# Patient Record
Sex: MALE | Race: BLACK OR AFRICAN AMERICAN | Employment: FULL TIME | URBAN - METROPOLITAN AREA
[De-identification: names, ages, dates, MRNs, and addresses within clinical notes are randomized per-mention and may not be internally consistent; named-entity substitution may affect disease eponyms.]

---

## 2017-04-02 ENCOUNTER — APPOINTMENT (OUTPATIENT)
Dept: GENERAL RADIOLOGY | Age: 27
End: 2017-04-02
Attending: EMERGENCY MEDICINE
Payer: COMMERCIAL

## 2017-04-02 ENCOUNTER — HOSPITAL ENCOUNTER (EMERGENCY)
Age: 27
Discharge: HOME | End: 2017-04-02
Attending: EMERGENCY MEDICINE
Payer: COMMERCIAL

## 2017-04-02 VITALS
RESPIRATION RATE: 15 BRPM | DIASTOLIC BLOOD PRESSURE: 87 MMHG | BODY MASS INDEX: 20.62 KG/M2 | HEART RATE: 117 BPM | SYSTOLIC BLOOD PRESSURE: 135 MMHG | TEMPERATURE: 100 F | WEIGHT: 165 LBS | OXYGEN SATURATION: 100 %

## 2017-04-02 DIAGNOSIS — J01.10 ACUTE NON-RECURRENT FRONTAL SINUSITIS: Primary | ICD-10-CM

## 2017-04-02 DIAGNOSIS — R07.89 CHEST TIGHTNESS: ICD-10-CM

## 2017-04-02 LAB
FLUAV AG NPH QL IA: NEGATIVE
FLUBV AG NOSE QL IA: NEGATIVE

## 2017-04-02 PROCEDURE — 71020 XR CHEST PA LAT: CPT

## 2017-04-02 PROCEDURE — 87804 INFLUENZA ASSAY W/OPTIC: CPT | Performed by: EMERGENCY MEDICINE

## 2017-04-02 PROCEDURE — 99282 EMERGENCY DEPT VISIT SF MDM: CPT

## 2017-04-02 RX ORDER — TRIAMCINOLONE ACETONIDE 55 UG/1
SPRAY, METERED NASAL
Qty: 16.5 G | Refills: 0 | Status: ON HOLD | OUTPATIENT
Start: 2017-04-02 | End: 2021-02-07

## 2017-04-02 RX ORDER — LORATADINE 10 MG/1
TABLET ORAL
Qty: 30 TAB | Refills: 0 | Status: SHIPPED | OUTPATIENT
Start: 2017-04-02 | End: 2018-02-21

## 2017-04-03 NOTE — ED NOTES
I have reviewed discharge instructions with the patient. The patient verbalized understanding. Patient armband removed and shredded. Patient discharged ambulatory to [facility].
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
## 2017-08-26 ENCOUNTER — HOSPITAL ENCOUNTER (EMERGENCY)
Age: 27
Discharge: HOME | End: 2017-08-26
Attending: EMERGENCY MEDICINE
Payer: COMMERCIAL

## 2017-08-26 VITALS
HEIGHT: 74 IN | HEART RATE: 99 BPM | WEIGHT: 163 LBS | DIASTOLIC BLOOD PRESSURE: 112 MMHG | OXYGEN SATURATION: 100 % | RESPIRATION RATE: 18 BRPM | SYSTOLIC BLOOD PRESSURE: 162 MMHG | TEMPERATURE: 98.2 F | BODY MASS INDEX: 20.92 KG/M2

## 2017-08-26 DIAGNOSIS — K08.89 TOOTHACHE: Primary | ICD-10-CM

## 2017-08-26 PROCEDURE — 99282 EMERGENCY DEPT VISIT SF MDM: CPT

## 2017-08-26 RX ORDER — HYDROCODONE BITARTRATE AND ACETAMINOPHEN 5; 325 MG/1; MG/1
TABLET ORAL
Qty: 6 TAB | Refills: 0 | Status: SHIPPED | OUTPATIENT
Start: 2017-08-26 | End: 2018-01-22

## 2017-08-26 RX ORDER — PENICILLIN V POTASSIUM 500 MG/1
500 TABLET, FILM COATED ORAL 4 TIMES DAILY
Qty: 40 TAB | Refills: 0 | Status: SHIPPED | OUTPATIENT
Start: 2017-08-26 | End: 2017-09-05
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
## 2017-08-26 NOTE — LETTER
NOTIFICATION RETURN TO WORK 
 
8/26/2017 7:03 PM 
 
Mr. Anu Krishnan [address] [place] [address]. [facility] [phone] To Whom It May Concern: 
 
Anu Krishnan is currently under the care of [facility] EMERGENCY DEPT. He will return to work on: 8/27/17 If there are questions or concerns please have the patient contact our office.  
 
 
 
Sincerely, 
 
 
 
 
 
 
 
 
Aly Ashraf MD

## 2018-01-22 ENCOUNTER — HOSPITAL ENCOUNTER (EMERGENCY)
Age: 28
Discharge: HOME | End: 2018-01-22
Attending: EMERGENCY MEDICINE | Admitting: EMERGENCY MEDICINE
Payer: SELF-PAY

## 2018-01-22 VITALS
WEIGHT: 168 LBS | DIASTOLIC BLOOD PRESSURE: 86 MMHG | TEMPERATURE: 98.7 F | HEIGHT: 75 IN | HEART RATE: 67 BPM | BODY MASS INDEX: 20.89 KG/M2 | RESPIRATION RATE: 16 BRPM | OXYGEN SATURATION: 100 % | SYSTOLIC BLOOD PRESSURE: 123 MMHG

## 2018-01-22 DIAGNOSIS — R03.0 ELEVATED BLOOD PRESSURE READING: ICD-10-CM

## 2018-01-22 DIAGNOSIS — F32.A DEPRESSION, UNSPECIFIED DEPRESSION TYPE: Primary | ICD-10-CM

## 2018-01-22 PROBLEM — R45.851 SUICIDAL IDEATION: Status: ACTIVE | Noted: 2018-01-22

## 2018-01-22 LAB
AMPHET UR QL SCN: NEGATIVE
ANION GAP SERPL CALC-SCNC: 6 MMOL/L (ref 3–18)
BARBITURATES UR QL SCN: NEGATIVE
BASOPHILS # BLD: 0 K/UL (ref 0–0.06)
BASOPHILS NFR BLD: 0 % (ref 0–2)
BENZODIAZ UR QL: NEGATIVE
BUN SERPL-MCNC: 13 MG/DL (ref 7–18)
BUN/CREAT SERPL: 14 (ref 12–20)
CALCIUM SERPL-MCNC: 9 MG/DL (ref 8.5–10.1)
CANNABINOIDS UR QL SCN: NEGATIVE
CHLORIDE SERPL-SCNC: 110 MMOL/L (ref 100–108)
CO2 SERPL-SCNC: 24 MMOL/L (ref 21–32)
COCAINE UR QL SCN: NEGATIVE
CREAT SERPL-MCNC: 0.92 MG/DL (ref 0.6–1.3)
DIFFERENTIAL METHOD BLD: NORMAL
EOSINOPHIL # BLD: 0.1 K/UL (ref 0–0.4)
EOSINOPHIL NFR BLD: 2 % (ref 0–5)
ERYTHROCYTE [DISTWIDTH] IN BLOOD BY AUTOMATED COUNT: 13.2 % (ref 11.6–14.5)
ETHANOL SERPL-MCNC: <3 MG/DL (ref 0–3)
GLUCOSE SERPL-MCNC: 92 MG/DL (ref 74–99)
HCT VFR BLD AUTO: 42.8 % (ref 36–48)
HDSCOM,HDSCOM: NORMAL
HGB BLD-MCNC: 15.2 G/DL (ref 13–16)
LYMPHOCYTES # BLD: 2.2 K/UL (ref 0.9–3.6)
LYMPHOCYTES NFR BLD: 32 % (ref 21–52)
MCH RBC QN AUTO: 31.1 PG (ref 24–34)
MCHC RBC AUTO-ENTMCNC: 35.5 G/DL (ref 31–37)
MCV RBC AUTO: 87.5 FL (ref 74–97)
METHADONE UR QL: NEGATIVE
MONOCYTES # BLD: 0.5 K/UL (ref 0.05–1.2)
MONOCYTES NFR BLD: 7 % (ref 3–10)
NEUTS SEG # BLD: 4.1 K/UL (ref 1.8–8)
NEUTS SEG NFR BLD: 59 % (ref 40–73)
OPIATES UR QL: NEGATIVE
PCP UR QL: NEGATIVE
PLATELET # BLD AUTO: 322 K/UL (ref 135–420)
PMV BLD AUTO: 9.4 FL (ref 9.2–11.8)
POTASSIUM SERPL-SCNC: 3.8 MMOL/L (ref 3.5–5.5)
RBC # BLD AUTO: 4.89 M/UL (ref 4.7–5.5)
SODIUM SERPL-SCNC: 140 MMOL/L (ref 136–145)
WBC # BLD AUTO: 6.9 K/UL (ref 4.6–13.2)

## 2018-01-22 PROCEDURE — 80048 BASIC METABOLIC PNL TOTAL CA: CPT | Performed by: PHYSICIAN ASSISTANT

## 2018-01-22 PROCEDURE — 80307 DRUG TEST PRSMV CHEM ANLYZR: CPT | Performed by: PHYSICIAN ASSISTANT

## 2018-01-22 PROCEDURE — 74011250637 HC RX REV CODE- 250/637: Performed by: PHYSICIAN ASSISTANT

## 2018-01-22 PROCEDURE — 99285 EMERGENCY DEPT VISIT HI MDM: CPT

## 2018-01-22 PROCEDURE — 85025 COMPLETE CBC W/AUTO DIFF WBC: CPT | Performed by: PHYSICIAN ASSISTANT

## 2018-01-22 RX ORDER — RISPERIDONE 0.5 MG/1
0.5 TABLET, ORALLY DISINTEGRATING ORAL 2 TIMES DAILY
Qty: 10 TAB | Refills: 0 | Status: ON HOLD | OUTPATIENT
Start: 2018-01-22 | End: 2021-02-07

## 2018-01-22 RX ORDER — ACETAMINOPHEN 325 MG/1
650 TABLET ORAL
Status: COMPLETED | OUTPATIENT
Start: 2018-01-22 | End: 2018-01-22

## 2018-01-22 RX ADMIN — ACETAMINOPHEN 650 MG: 325 TABLET, FILM COATED ORAL at 13:05

## 2018-01-22 NOTE — Clinical Note
Follow-up with the resources provided within 2 days for reassessment. Bring the results from this visit with your for their review. Return to the ED for any new, worsening, or persistent symptoms, including thoughts of hurting yourself or others, increas ed depression, or any other medical concerns.
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
## 2018-01-22 NOTE — LETTER
[facility] EMERGENCY DEPT 
[phone] 
[phone] Work/School Note Date: 1/22/2018 To Whom It May concern: 
 
Charlean Cooks was seen and treated today in the emergency room by the following provider(s): 
Attending Provider: Leigh Ann Johnson DO Physician Assistant: Iesha Yin. Charlean Cooks may return to work on 1/23/18. Sincerely, Yvonne Guerrero

## 2018-01-22 NOTE — ED NOTES
Tamie Diallo called for assistance with placement. Spoke with Joann Rivera and information given.   Per Joann Felt they might not be able to look at patient tonight however patient will be on list.
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
## 2018-01-23 NOTE — ED NOTES
6:00 PM  Assumed care from [facility]. Pending 2nd consult for Tele-psych. PT was evaluated by [facility] and does not warrant a TDO. PT signed contract for safety and was given outpatient resources. Waiting for tele-psych for recommendations for outpatient care medication before discharge. 7:30 PM Pt is becoming frustrated in room. Pt states that he signed the contract and wants to leave now and does not understand why staff will not let him go. I explained pending consult with psychiatrist but pt is frustrated with length of time it is taking for return call. Discussed pt with Dr Heber Bob and will discharge home on Risperidone .5mg. Consult:    7:36 PM   Discussed care with Dr Elias Ceja, tele psych. Standard discussion; including history of patients chief complaint, available diagnostic results, and treatment course. PLAN: Discharge home with short course of Risperidone .5 BID  No more than 10 pills. Then outpatient follow up.

## 2018-01-23 NOTE — ED NOTES
I have reviewed discharge instructions with the patient. The patient verbalized understanding. Patient armband removed and shredded. VSS. Patient verbalized understanding of how to properly take prescribed medication. Patient given contract for safety to take home and scanned in to chart.  Patient given 1 bag of belongings back and envelope from security

## 2018-01-23 NOTE — DISCHARGE INSTRUCTIONS
Elevated Blood Pressure: Care Instructions  Your Care Instructions    Blood pressure is a measure of how hard the blood pushes against the walls of your arteries. It's normal for blood pressure to go up and down throughout the day. But if it stays up over time, you have high blood pressure. Two numbers tell you your blood pressure. The first number is the systolic pressure. It shows how hard the blood pushes when your heart is pumping. The second number is the diastolic pressure. It shows how hard the blood pushes between heartbeats, when your heart is relaxed and filling with blood. An ideal blood pressure in adults is less than 120/80 (say \"120 over 80\"). High blood pressure is 140/90 or higher. You have high blood pressure if your top number is 140 or higher or your bottom number is 90 or higher, or both. The main test for high blood pressure is simple, fast, and painless. To diagnose high blood pressure, your doctor will test your blood pressure at different times. After testing your blood pressure, your doctor may ask you to test it again when you are home. If you are diagnosed with high blood pressure, you can work with your doctor to make a long-term plan to manage it. Follow-up care is a key part of your treatment and safety. Be sure to make and go to all appointments, and call your doctor if you are having problems. It's also a good idea to know your test results and keep a list of the medicines you take. How can you care for yourself at home? · Do not smoke. Smoking increases your risk for heart attack and stroke. If you need help quitting, talk to your doctor about stop-smoking programs and medicines. These can increase your chances of quitting for good. · Stay at a healthy weight. · Try to limit how much sodium you eat to less than 2,300 milligrams (mg) a day. Your doctor may ask you to try to eat less than 1,500 mg a day. · Be physically active.  Get at least 30 minutes of exercise on most days of the week. Walking is a good choice. You also may want to do other activities, such as running, swimming, cycling, or playing tennis or team sports. · Avoid or limit alcohol. Talk to your doctor about whether you can drink any alcohol. · Eat plenty of fruits, vegetables, and low-fat dairy products. Eat less saturated and total fats. · Learn how to check your blood pressure at home. When should you call for help? Call your doctor now or seek immediate medical care if:  ? · Your blood pressure is much higher than normal (such as 180/110 or higher). ? · You think high blood pressure is causing symptoms such as:  ¨ Severe headache. ¨ Blurry vision. ? Watch closely for changes in your health, and be sure to contact your doctor if:  ? · You do not get better as expected. Where can you learn more? Go to http://marActive-Semidavid.info/. Enter W587 in the search box to learn more about \"Elevated Blood Pressure: Care Instructions. \"  Current as of: September 21, 2016  Content Version: 11.4  © 9699-5209 [employer]. Care instructions adapted under license by [employer] (which disclaims liability or warranty for this information). If you have questions about a medical condition or this instruction, always ask your healthcare professional. [address] any warranty or liability for your use of this information. Preventing a Relapse of Depression: Care Instructions  Your Care Instructions    A relapse of depression means your symptoms have come back after you have gotten better. This illness often comes and goes during a lifetime. But there are many things you can do to keep it from coming back. Follow-up care is a key part of your treatment and safety. Be sure to make and go to all appointments, and call your doctor if you are having problems. It's also a good idea to know your test results and keep a list of the medicines you take.   What do you need to know? Know your risk of relapse  Talk to your doctor to find out if you are at risk of relapse. Many things can make a person more likely to relapse into depression. These include having a family member with depression, dealing with serious problems in a relationship or a job, having a serious medical condition, or abusing drugs or alcohol. It is important to know your risk and to recognize warning signs of relapse. Once you know these things, you will be better able to keep it from happening to you. Know the warning signs of relapse  The two most common signs of relapse are:  · Feeling sad or hopeless. · Losing interest in your daily activities. You may have other symptoms, such as:  · You lose or gain weight. · You sleep too much or not enough. · You feel restless and unable to sit still. · You feel unable to move. · You feel tired all the time. · You feel unworthy or guilty without an obvious reason. · You have problems concentrating, remembering, or making decisions. · You think often about death or suicide. · You feel angry or have panic attacks. How can you care for yourself at home? · Take your medicine as prescribed. Call your doctor if you have any problems with your medicine. Many people take their medicines for at least 6 months after they have recovered. This often helps keep symptoms from coming back. However, if your depression keeps coming back, you may have to take medicine for the rest of your life. · Continue counseling even after you have stopped taking medicine. · Eat healthy foods. Include fruits, vegetables, beans, and whole grains in your diet each day. · Get at least 30 minutes of exercise on most days of the week. Walking is a good choice. You also may want to do other activities, such as running, swimming, cycling, or playing tennis or team sports. · See your doctor right away if you have new symptoms or feel that your depression is coming back.   · Keep a regular sleep schedule. Try for 8 hours of sleep a night. · Avoid alcohol and illegal drugs. · Keep the numbers for these national suicide hotlines: 8-298-560-TALK (3-942.439.5360) and 8-288-GPZKVBR (4-675.209.6776). If you or someone you know talks about suicide or feeling hopeless, get help right away. When should you call for help? Call 911 anytime you think you may need emergency care. For example, call if:  ? · You are thinking about suicide or are threatening suicide. ? · You feel you cannot stop from hurting yourself or someone else. ? · You hear or see things that aren't real.   ? · You think or speak in a bizarre way that is not like your usual behavior. ?Call your doctor now or seek immediate medical care if:  ? · You are drinking a lot of alcohol or using illegal drugs. ? · You are talking or writing about death. ? Watch closely for changes in your health, and be sure to contact your doctor if:  ? · You find it hard or it's getting harder to deal with school, a job, family, or friends. ? · You think your treatment is not helping or you are not getting better. ? · Your symptoms get worse or you get new symptoms. ? · You have any problems with your antidepressant medicines, such as side effects, or you are thinking about stopping your medicine. ? · You are having manic behavior, such as having very high energy, needing less sleep than normal, or showing risky behavior such as spending money you don't have or abusing others verbally or physically. Where can you learn more? Go to http://mar-david.info/. Enter S791 in the search box to learn more about \"Preventing a Relapse of Depression: Care Instructions. \"  Current as of: May 12, 2017  Content Version: 11.4  © 3584-5391 [employer]. Care instructions adapted under license by Remote (which disclaims liability or warranty for this information).  If you have questions about a medical condition or this instruction, always ask your healthcare professional. [address] any warranty or liability for your use of this information. Depression Treatment: Care Instructions  Your Care Instructions    Depression is a condition that affects the way you feel, think, and act. It causes symptoms such as low energy, loss of interest in daily activities, and sadness or grouchiness that goes on for a long time. Depression is very common and affects men and women of all ages. Depression is a medical illness caused by changes in the natural chemicals in your brain. It is not a character flaw, and it does not mean that you are a bad or weak person. It does not mean that you are going crazy. It is important to know that depression can be treated. Medicines, counseling, and self-care can all help. Many people do not get help because they are embarrassed or think that they will get over the depression on their own. But some people do not get better without treatment. Follow-up care is a key part of your treatment and safety. Be sure to make and go to all appointments, and call your doctor if you are having problems. It's also a good idea to know your test results and keep a list of the medicines you take. How can you care for yourself at home? Learn about antidepressant medicines  Antidepressant medicines can improve or end the symptoms of depression. You may need to take the medicine for at least 6 months, and often longer. Keep taking your medicine even if you feel better. If you stop taking it too soon, your symptoms may come back or get worse. You may start to feel better within 1 to 3 weeks of taking antidepressant medicine. But it can take as many as 6 to 8 weeks to see more improvement. Talk to your doctor if you have problems with your medicine or if you do not notice any improvement after 3 weeks. Antidepressants can make you feel tired, dizzy, or nervous.  Some people have dry mouth, constipation, headaches, sexual problems, an upset stomach, or diarrhea. Many of these side effects are mild and go away on their own after you take the medicine for a few weeks. Some may last longer. Talk to your doctor if side effects bother you too much. You might be able to try a different medicine. If you are pregnant or breastfeeding, talk to your doctor about what medicines you can take. Learn about counseling  In many cases, counseling can work as well as medicines to treat mild to moderate depression. Counseling is done by licensed mental health providers, such as psychologists, social workers, and some types of nurses. It can be done in one-on-one sessions or in a group setting. Many people find group sessions helpful. Cognitive-behavioral therapy is a type of counseling. In this treatment therapy, you learn how to see and change unhelpful thinking styles that may be adding to your depression. Counseling and medicines often work well when used together. To manage depression  · Be physically active. Getting 30 minutes of exercise each day is good for your body and your mind. Begin slowly if it is hard for you to get started. If you already exercise, keep it up. · Plan something pleasant for yourself every day. Include activities that you have enjoyed in the past.  · Get enough sleep. Talk to your doctor if you have problems sleeping. · Eat a balanced diet. If you do not feel hungry, eat small snacks rather than large meals. · Do not drink alcohol, use illegal drugs, or take medicines that your doctor has not prescribed for you. They may interfere with your treatment. · Spend time with family and friends. It may help to speak openly about your depression with people you trust.  · Take your medicines exactly as prescribed. Call your doctor if you think you are having a problem with your medicine. · Do not make major life decisions while you are depressed.  Depression may change the way you think. Elizabeth Yost will be able to make better decisions after you feel better. · Think positively. Challenge negative thoughts with statements such as \"I am hopeful\"; \"Things will get better\"; and \"I can ask for the help I need. \" Write down these statements and read them often, even if you don't believe them yet. · Be patient with yourself. It took time for your depression to develop, and it will take time for your symptoms to improve. Do not take on too much or be too hard on yourself. · Learn all you can about depression from written and online materials. · Check out behavioral health classes to learn more about dealing with depression. · Keep the numbers for these national suicide hotlines: 5-403-388-TALK (5-694.410.1766) and 3-241-WXQFXCH (2-907.487.4765). If you or someone you know talks about suicide or feeling hopeless, get help right away. When should you call for help? Call 911 anytime you think you may need emergency care. For example, call if:  ? · You feel you cannot stop from hurting yourself or someone else. ?Call your doctor now or seek immediate medical care if:  ? · You hear voices. ? · You feel much more depressed. ? Watch closely for changes in your health, and be sure to contact your doctor if:  ? · You are having problems with your depression medicine. ? · You are not getting better as expected. Where can you learn more? Go to http://mra-david.info/. Enter B937 in the search box to learn more about \"Depression Treatment: Care Instructions. \"  Current as of: May 12, 2017  Content Version: 11.4  © 4453-2611 [employer]. Care instructions adapted under license by [employer] (which disclaims liability or warranty for this information).  If you have questions about a medical condition or this instruction, always ask your healthcare professional. [address] any warranty or liability for your use of this information. MyChart Activation    Thank you for requesting access to Telebit. Please follow the instructions below to securely access and download your online medical record. Telebit allows you to send messages to your doctor, view your test results, renew your prescriptions, schedule appointments, and more. How Do I Sign Up? 1. In your internet browser, go to [email or web address]  2. Click on the First Time User? Click Here link in the Sign In box. You will be redirect to the New Member Sign Up page. 3. Enter your Telebit Access Code exactly as it appears below. You will not need to use this code after youve completed the sign-up process. If you do not sign up before the expiration date, you must request a new code. Telebit Access Code: [email or web address]  Expires: 2018  5:52 PM (This is the date your Telebit access code will )    4. Enter the last four digits of your Social Security Number (xxxx) and Date of Birth (mm/dd/yyyy) as indicated and click Submit. You will be taken to the next sign-up page. 5. Create a Telebit ID. This will be your Telebit login ID and cannot be changed, so think of one that is secure and easy to remember. 6. Create a Telebit password. You can change your password at any time. 7. Enter your Password Reset Question and Answer. This can be used at a later time if you forget your password. 8. Enter your e-mail address. You will receive e-mail notification when new information is available in [address]. 9. Click Sign Up. You can now view and download portions of your medical record. 10. Click the Download Summary menu link to download a portable copy of your medical information. Additional Information    If you have questions, please visit the Frequently Asked Questions section of the Telebit website at [email or web address] Remember, Telebit is NOT to be used for urgent needs. For medical emergencies, dial 911.

## 2018-01-23 NOTE — ED NOTES
Patient very angry at bedside stating \"im about to walk out of here, there is no reason why ic ant leave, im about to walk out of here, I dont want to be that susan but im about to walk out\". Valente Paez., PA at bedside explaining to patient he is waiting to hear from psychiatrist. Psych assessment delayed because patient very angry at this time.

## 2018-01-23 NOTE — ED NOTES
Verbal shift change report given to Gracie Flores RN (oncoming nurse) by Bay Ling RN (offgoing nurse). Report included the following information SBAR, ED Summary and MAR.

## 2018-02-21 ENCOUNTER — HOSPITAL ENCOUNTER (EMERGENCY)
Age: 28
Discharge: HOME | End: 2018-02-21
Attending: EMERGENCY MEDICINE
Payer: SELF-PAY

## 2018-02-21 VITALS
OXYGEN SATURATION: 100 % | BODY MASS INDEX: 21.37 KG/M2 | SYSTOLIC BLOOD PRESSURE: 122 MMHG | HEART RATE: 83 BPM | RESPIRATION RATE: 16 BRPM | TEMPERATURE: 97.8 F | DIASTOLIC BLOOD PRESSURE: 85 MMHG | WEIGHT: 171 LBS

## 2018-02-21 DIAGNOSIS — F07.81 POST CONCUSSIVE SYNDROME: Primary | ICD-10-CM

## 2018-02-21 PROCEDURE — 99283 EMERGENCY DEPT VISIT LOW MDM: CPT

## 2018-02-21 RX ORDER — NAPROXEN 500 MG/1
500 TABLET ORAL 2 TIMES DAILY WITH MEALS
Qty: 20 TAB | Refills: 0 | OUTPATIENT
Start: 2018-02-21 | End: 2019-11-01

## 2018-02-21 RX ORDER — ACETAMINOPHEN 500 MG
1000 TABLET ORAL
Qty: 40 TAB | Refills: 0 | Status: SHIPPED | OUTPATIENT
Start: 2018-02-21

## 2018-02-21 NOTE — LETTER
NOTIFICATION RETURN TO WORK / SCHOOL 
 
2/21/2018 8:58 PM 
 
Mr. Alis Ward [address] [place] [address]. [place] [id number] To Whom It May Concern: 
 
Alis Ward is currently under the care of [facility] EMERGENCY DEPT. He will return to work/school on: 2/26/18 If there are questions or concerns please have the patient contact our office. Sincerely, Evelyn Pitt MD
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
## 2019-11-01 ENCOUNTER — HOSPITAL ENCOUNTER (EMERGENCY)
Age: 29
Discharge: HOME | End: 2019-11-01
Attending: EMERGENCY MEDICINE
Payer: SELF-PAY

## 2019-11-01 VITALS
BODY MASS INDEX: 23.13 KG/M2 | DIASTOLIC BLOOD PRESSURE: 100 MMHG | OXYGEN SATURATION: 100 % | HEIGHT: 75 IN | HEART RATE: 86 BPM | WEIGHT: 186 LBS | TEMPERATURE: 98.5 F | RESPIRATION RATE: 18 BRPM | SYSTOLIC BLOOD PRESSURE: 158 MMHG

## 2019-11-01 DIAGNOSIS — K08.89 TOOTHACHE: Primary | ICD-10-CM

## 2019-11-01 PROCEDURE — 74011250637 HC RX REV CODE- 250/637: Performed by: EMERGENCY MEDICINE

## 2019-11-01 PROCEDURE — 99283 EMERGENCY DEPT VISIT LOW MDM: CPT

## 2019-11-01 RX ORDER — NAPROXEN 500 MG/1
500 TABLET ORAL 2 TIMES DAILY WITH MEALS
Qty: 20 TAB | Refills: 0 | Status: SHIPPED | OUTPATIENT
Start: 2019-11-01 | End: 2019-11-11

## 2019-11-01 RX ORDER — PENICILLIN V POTASSIUM 500 MG/1
500 TABLET, FILM COATED ORAL 4 TIMES DAILY
Qty: 40 TAB | Refills: 0 | Status: SHIPPED | OUTPATIENT
Start: 2019-11-01 | End: 2019-11-11

## 2019-11-01 RX ORDER — HYDROCODONE BITARTRATE AND ACETAMINOPHEN 7.5; 325 MG/1; MG/1
1 TABLET ORAL
Status: COMPLETED | OUTPATIENT
Start: 2019-11-01 | End: 2019-11-01

## 2019-11-01 RX ORDER — HYDROCODONE BITARTRATE AND ACETAMINOPHEN 7.5; 325 MG/1; MG/1
TABLET ORAL
Status: DISCONTINUED
Start: 2019-11-01 | End: 2019-11-01 | Stop reason: HOSPADM

## 2019-11-01 RX ADMIN — HYDROCODONE BITARTRATE AND ACETAMINOPHEN 1 TABLET: 7.5; 325 TABLET ORAL at 01:19
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
## 2019-11-01 NOTE — DISCHARGE INSTRUCTIONS
SPECIFIC PATIENT INSTRUCTIONS FROM THE PHYSICIAN WHO TREATED YOU IN THE ER TODAY  1. Return if any concerns or worsening of condition(s)  2. Penicillin as prescribed until finished. Naprosyn for pain not controlled with over the counter Tylenol. 3.  Follow up with your dentist or a dental clinic from the sheets given to you in the ER today as soon as possible. Patient Education        Tooth and Gum Pain in Teens: Care Instructions  Your Care Instructions    The most common causes of dental pain are tooth decay and gum disease. Pain can also be caused by an infection of the tooth (abscess) or the gums. Or you may have pain from a broken or cracked tooth. Other causes of pain include infection and damage to a tooth from nervous grinding of your teeth. A wisdom tooth can be painful when it is coming in but cannot break through the gum. It can also be painful when the tooth is only partway in and extra gum tissue has formed around it. The tissue can get inflamed (pericoronitis), and sometimes it gets infected. Prompt dental care can help find the cause of your toothache and keep the tooth from dying or gum disease from getting worse. Self-care at home may reduce your pain and discomfort. Follow-up care is a key part of your treatment and safety. Be sure to make and go to all appointments, and call your dentist or doctor if you are having problems. It's also a good idea to know your test results and keep a list of the medicines you take. How can you care for yourself at home? · To reduce pain and facial swelling, put an ice or cold pack on the outside of your cheek for 10 to 20 minutes at a time. Put a thin cloth between the ice and your skin. Do not use heat. · If your doctor prescribed antibiotics, take them as directed. Do not stop taking them just because you feel better. You need to take the full course of antibiotics.   · Ask your doctor if you can take an over-the-counter pain medicine, such as acetaminophen (Tylenol), ibuprofen (Advil, Motrin), or naproxen (Aleve). Be safe with medicines. Read and follow all instructions on the label. · Avoid very hot, cold, or sweet foods and drinks if they increase your pain. · Rinse your mouth with warm salt water every 2 hours to help relieve pain and swelling. Mix 1 teaspoon of salt in 8 ounces of water. · Talk to your dentist about using special toothpaste for sensitive teeth. To reduce pain on contact with heat or cold or when brushing, brush with this toothpaste regularly or rub a small amount of the paste on the sensitive area with a clean finger 2 or 3 times a day. Floss gently between your teeth. · Do not smoke or use spit tobacco. Tobacco use can make gum problems worse, decreases your ability to fight infection in your gums, and delays healing. If you need help quitting, talk to your doctor about stop-smoking programs and medicines. These can increase your chances of quitting for good. When should you call for help? Call 911 anytime you think you may need emergency care. For example, call if:    · You have trouble breathing.    Call your dentist or doctor now or seek immediate medical care if:    · You have signs of infection, such as:  ? Increased pain, swelling, warmth, or redness. ? Red streaks leading from the area. ? Pus draining from the area. ? A fever.    Watch closely for changes in your health, and be sure to contact your dentist or doctor if:    · You do not get better as expected. Where can you learn more? Go to http://mar-david.info/. Enter P990 in the search box to learn more about \"Tooth and Gum Pain in Teens: Care Instructions. \"  Current as of: October 3, 2018  Content Version: 12.2  © 5173-6844 [employer], [employer]. Care instructions adapted under license by [employer] (which disclaims liability or warranty for this information).  If you have questions about a medical condition or this instruction, always ask your healthcare professional. [place] [address] any warranty or liability for your use of this information. [facility] [employer] [facility] [facility] Activation    Thank you for requesting access to [facility] [employer] [facility] [facility]. Please follow the instructions below to securely access and download your online medical record. [facility] [employer] [facility] [facility] allows you to send messages to your doctor, view your test results, renew your prescriptions, schedule appointments, and more. How Do I Sign Up? In your internet browser, go to [email or web address]. Click on the First Time User? Click Here link in the Sign In box. You will see the New Member Sign Up page. Enter your [facility] [facility] Access Code exactly as it appears below. You will not need to use this code after you´ve completed the sign-up process. If you do not sign up before the expiration date, you must request a new code. [facility] Access Code: [email or web address]  Expires: 3/28/2019  2:27 PM (This is the date your [facility] access code will )    Enter the last four digits of your Social Security Number (xxxx) and Date of Birth (mm/dd/yyyy) as indicated and click Submit. You will be taken to the next sign-up page. Create a [facility] [facility] ID. This will be your [facility] login ID and cannot be changed, so think of one that is secure and easy to remember. Create a [facility] password. You can change your password at any time. Enter your Password Reset Question and Answer. This can be used at a later time if you forget your password. Enter your e-mail address. You will receive e-mail notification when new information is available in [address] Click Sign Up. You can now view and download portions of your medical record. Click the [email or web address] link to download a portable copy of your medical information.     Additional Information    If you have questions, please visit the Frequently Asked Questions section of the [facility] [employer] [facility] [facility] website at [email or web address] Remember, [employer] is NOT to be used for urgent needs. For medical emergencies, dial 911.

## 2021-02-05 ENCOUNTER — HOSPITAL ENCOUNTER (INPATIENT)
Age: 31
LOS: 3 days | Discharge: HOME | DRG: 885 | End: 2021-02-08
Attending: EMERGENCY MEDICINE | Admitting: PSYCHIATRY & NEUROLOGY
Payer: COMMERCIAL

## 2021-02-05 DIAGNOSIS — F29 PSYCHOSIS, UNSPECIFIED PSYCHOSIS TYPE (HCC): ICD-10-CM

## 2021-02-05 DIAGNOSIS — F20.0 PARANOID SCHIZOPHRENIA (HCC): ICD-10-CM

## 2021-02-05 DIAGNOSIS — F22 PARANOID DELUSION (HCC): Primary | ICD-10-CM

## 2021-02-05 LAB
ALBUMIN SERPL-MCNC: 4.5 G/DL (ref 3.5–5)
ALBUMIN/GLOB SERPL: 1.3 {RATIO} (ref 1.1–2.2)
ALP SERPL-CCNC: 67 U/L (ref 45–117)
ALT SERPL-CCNC: 27 U/L (ref 12–78)
AMPHET UR QL SCN: NEGATIVE
ANION GAP SERPL CALC-SCNC: 16 MMOL/L (ref 5–15)
APPEARANCE UR: CLEAR
AST SERPL-CCNC: 45 U/L (ref 15–37)
BACTERIA URNS QL MICRO: NEGATIVE /HPF
BARBITURATES UR QL SCN: NEGATIVE
BASOPHILS # BLD: 0 K/UL (ref 0–0.1)
BASOPHILS NFR BLD: 0 % (ref 0–1)
BENZODIAZ UR QL: NEGATIVE
BILIRUB SERPL-MCNC: 0.8 MG/DL (ref 0.2–1)
BILIRUB UR QL: NEGATIVE
BUN SERPL-MCNC: 10 MG/DL (ref 6–20)
BUN/CREAT SERPL: 8 (ref 12–20)
CALCIUM SERPL-MCNC: 8.9 MG/DL (ref 8.5–10.1)
CANNABINOIDS UR QL SCN: POSITIVE
CHLORIDE SERPL-SCNC: 95 MMOL/L (ref 97–108)
CO2 SERPL-SCNC: 21 MMOL/L (ref 21–32)
COCAINE UR QL SCN: NEGATIVE
COLOR UR: ABNORMAL
COVID-19 RAPID TEST, COVR: NOT DETECTED
CREAT SERPL-MCNC: 1.22 MG/DL (ref 0.7–1.3)
DIFFERENTIAL METHOD BLD: NORMAL
DRUG SCRN COMMENT,DRGCM: ABNORMAL
EOSINOPHIL # BLD: 0 K/UL (ref 0–0.4)
EOSINOPHIL NFR BLD: 0 % (ref 0–7)
EPITH CASTS URNS QL MICRO: ABNORMAL /LPF
ERYTHROCYTE [DISTWIDTH] IN BLOOD BY AUTOMATED COUNT: 11.9 % (ref 11.5–14.5)
ETHANOL SERPL-MCNC: <10 MG/DL
GLOBULIN SER CALC-MCNC: 3.4 G/DL (ref 2–4)
GLUCOSE SERPL-MCNC: 78 MG/DL (ref 65–100)
GLUCOSE UR STRIP.AUTO-MCNC: NEGATIVE MG/DL
HCT VFR BLD AUTO: 40.7 % (ref 36.6–50.3)
HGB BLD-MCNC: 14.4 G/DL (ref 12.1–17)
HGB UR QL STRIP: NEGATIVE
IMM GRANULOCYTES # BLD AUTO: 0 K/UL (ref 0–0.04)
IMM GRANULOCYTES NFR BLD AUTO: 0 % (ref 0–0.5)
KETONES UR QL STRIP.AUTO: 40 MG/DL
LEUKOCYTE ESTERASE UR QL STRIP.AUTO: NEGATIVE
LYMPHOCYTES # BLD: 1.5 K/UL (ref 0.8–3.5)
LYMPHOCYTES NFR BLD: 16 % (ref 12–49)
MCH RBC QN AUTO: 30.9 PG (ref 26–34)
MCHC RBC AUTO-ENTMCNC: 35.4 G/DL (ref 30–36.5)
MCV RBC AUTO: 87.3 FL (ref 80–99)
METHADONE UR QL: NEGATIVE
MONOCYTES # BLD: 1 K/UL (ref 0–1)
MONOCYTES NFR BLD: 11 % (ref 5–13)
NEUTS SEG # BLD: 6.9 K/UL (ref 1.8–8)
NEUTS SEG NFR BLD: 73 % (ref 32–75)
NITRITE UR QL STRIP.AUTO: NEGATIVE
NRBC # BLD: 0 K/UL (ref 0–0.01)
NRBC BLD-RTO: 0 PER 100 WBC
OPIATES UR QL: NEGATIVE
PCP UR QL: NEGATIVE
PH UR STRIP: 6.5 [PH] (ref 5–8)
PLATELET # BLD AUTO: 330 K/UL (ref 150–400)
PMV BLD AUTO: 9.4 FL (ref 8.9–12.9)
POTASSIUM SERPL-SCNC: 3 MMOL/L (ref 3.5–5.1)
PROT SERPL-MCNC: 7.9 G/DL (ref 6.4–8.2)
PROT UR STRIP-MCNC: NEGATIVE MG/DL
RBC # BLD AUTO: 4.66 M/UL (ref 4.1–5.7)
RBC #/AREA URNS HPF: ABNORMAL /HPF (ref 0–5)
SARS-COV-2, COV2: NORMAL
SODIUM SERPL-SCNC: 132 MMOL/L (ref 136–145)
SOURCE, COVRS: NORMAL
SP GR UR REFRACTOMETRY: <1.005 (ref 1–1.03)
UA: UC IF INDICATED,UAUC: ABNORMAL
UROBILINOGEN UR QL STRIP.AUTO: 0.2 EU/DL (ref 0.2–1)
WBC # BLD AUTO: 9.4 K/UL (ref 4.1–11.1)
WBC URNS QL MICRO: ABNORMAL /HPF (ref 0–4)

## 2021-02-05 PROCEDURE — 82077 ASSAY SPEC XCP UR&BREATH IA: CPT

## 2021-02-05 PROCEDURE — 65220000003 HC RM SEMIPRIVATE PSYCH

## 2021-02-05 PROCEDURE — 80307 DRUG TEST PRSMV CHEM ANLYZR: CPT

## 2021-02-05 PROCEDURE — 99285 EMERGENCY DEPT VISIT HI MDM: CPT

## 2021-02-05 PROCEDURE — 36415 COLL VENOUS BLD VENIPUNCTURE: CPT

## 2021-02-05 PROCEDURE — U0003 INFECTIOUS AGENT DETECTION BY NUCLEIC ACID (DNA OR RNA); SEVERE ACUTE RESPIRATORY SYNDROME CORONAVIRUS 2 (SARS-COV-2) (CORONAVIRUS DISEASE [COVID-19]), AMPLIFIED PROBE TECHNIQUE, MAKING USE OF HIGH THROUGHPUT TECHNOLOGIES AS DESCRIBED BY CMS-2020-01-R: HCPCS

## 2021-02-05 PROCEDURE — 85025 COMPLETE CBC W/AUTO DIFF WBC: CPT

## 2021-02-05 PROCEDURE — 81001 URINALYSIS AUTO W/SCOPE: CPT

## 2021-02-05 PROCEDURE — 74011250637 HC RX REV CODE- 250/637: Performed by: EMERGENCY MEDICINE

## 2021-02-05 PROCEDURE — 90791 PSYCH DIAGNOSTIC EVALUATION: CPT

## 2021-02-05 PROCEDURE — 80053 COMPREHEN METABOLIC PANEL: CPT

## 2021-02-05 PROCEDURE — 87635 SARS-COV-2 COVID-19 AMP PRB: CPT

## 2021-02-05 RX ORDER — DIAZEPAM 5 MG/1
10 TABLET ORAL
Status: COMPLETED | OUTPATIENT
Start: 2021-02-05 | End: 2021-02-05

## 2021-02-05 RX ORDER — HALOPERIDOL 5 MG/ML
5 INJECTION INTRAMUSCULAR
Status: DISCONTINUED | OUTPATIENT
Start: 2021-02-05 | End: 2021-02-08 | Stop reason: HOSPADM

## 2021-02-05 RX ORDER — POTASSIUM CHLORIDE 750 MG/1
20 TABLET, FILM COATED, EXTENDED RELEASE ORAL
Status: COMPLETED | OUTPATIENT
Start: 2021-02-05 | End: 2021-02-05

## 2021-02-05 RX ORDER — ACETAMINOPHEN 325 MG/1
650 TABLET ORAL
Status: DISCONTINUED | OUTPATIENT
Start: 2021-02-05 | End: 2021-02-08 | Stop reason: HOSPADM

## 2021-02-05 RX ORDER — OLANZAPINE 5 MG/1
5 TABLET ORAL
Status: DISCONTINUED | OUTPATIENT
Start: 2021-02-05 | End: 2021-02-08 | Stop reason: HOSPADM

## 2021-02-05 RX ORDER — OLANZAPINE 5 MG/1
10 TABLET, ORALLY DISINTEGRATING ORAL
Status: COMPLETED | OUTPATIENT
Start: 2021-02-05 | End: 2021-02-05

## 2021-02-05 RX ORDER — BENZTROPINE MESYLATE 1 MG/1
1 TABLET ORAL
Status: DISCONTINUED | OUTPATIENT
Start: 2021-02-05 | End: 2021-02-08 | Stop reason: HOSPADM

## 2021-02-05 RX ORDER — DIPHENHYDRAMINE HYDROCHLORIDE 50 MG/ML
50 INJECTION, SOLUTION INTRAMUSCULAR; INTRAVENOUS
Status: DISCONTINUED | OUTPATIENT
Start: 2021-02-05 | End: 2021-02-08 | Stop reason: HOSPADM

## 2021-02-05 RX ORDER — IBUPROFEN 200 MG
1 TABLET ORAL DAILY
Status: DISCONTINUED | OUTPATIENT
Start: 2021-02-06 | End: 2021-02-05 | Stop reason: SDUPTHER

## 2021-02-05 RX ORDER — ADHESIVE BANDAGE
30 BANDAGE TOPICAL DAILY PRN
Status: DISCONTINUED | OUTPATIENT
Start: 2021-02-05 | End: 2021-02-08 | Stop reason: HOSPADM

## 2021-02-05 RX ORDER — HYDROXYZINE 25 MG/1
50 TABLET, FILM COATED ORAL
Status: DISCONTINUED | OUTPATIENT
Start: 2021-02-05 | End: 2021-02-08 | Stop reason: HOSPADM

## 2021-02-05 RX ORDER — IBUPROFEN 200 MG
1 TABLET ORAL DAILY
Status: DISCONTINUED | OUTPATIENT
Start: 2021-02-06 | End: 2021-02-08 | Stop reason: HOSPADM

## 2021-02-05 RX ORDER — TRAZODONE HYDROCHLORIDE 50 MG/1
50 TABLET ORAL
Status: DISCONTINUED | OUTPATIENT
Start: 2021-02-05 | End: 2021-02-08 | Stop reason: HOSPADM

## 2021-02-05 RX ORDER — LORAZEPAM 2 MG/ML
1 INJECTION INTRAMUSCULAR
Status: DISCONTINUED | OUTPATIENT
Start: 2021-02-05 | End: 2021-02-08 | Stop reason: HOSPADM

## 2021-02-05 RX ADMIN — OLANZAPINE 10 MG: 5 TABLET, ORALLY DISINTEGRATING ORAL at 16:00

## 2021-02-05 RX ADMIN — DIAZEPAM 10 MG: 5 TABLET ORAL at 14:31

## 2021-02-05 RX ADMIN — POTASSIUM CHLORIDE 20 MEQ: 750 TABLET, FILM COATED, EXTENDED RELEASE ORAL at 18:41

## 2021-02-05 NOTE — ED TRIAGE NOTES
C/o SI with plan to [place] myself out 'till I die\" and anxiety, pt states \"My brain hasn't been right. \" Pt reporting auditory hallucinations \"saying all types of things. \" Pt is paranoid, reports he believes there are people trying to harm him and his family. Denies psychiatric diagnoses but reports he has been depressed for 4-5 years. Pt denies that he takes any medications. Pt denies HI.

## 2021-02-05 NOTE — ED NOTES
Awaiting TDO at this time, floor would not take report at this time. Pt resting quietly. Bedside shift change report given to Sandor (oncoming nurse) by Robbie Lennox (offgoing nurse). Report included the following information SBAR.

## 2021-02-05 NOTE — ED NOTES
Patient ran out of ED and down the sidewalk. Patient's dad seen patient ran out of ED and chases behind and able to call his name and patient ran back to dad. This RN, MD and [employer]PD outside with patient and redirected him back to the ED. [place] spoke with [facility] and informed regarding patient.

## 2021-02-05 NOTE — BSMART NOTE
Comprehensive Assessment Form Part 1 Section I - Disposition Axis I - Unspecified Psychosis Axis II - Deferred Axis III - None Axis IV - Unknown Axis V - 35 The Medical Doctor to Psychiatrist conference was not completed. The Medical Doctor is in agreement with Psychiatrist disposition because of (reason) patient does not want to be admitted voluntarily, and he does not appear to have capacity. The plan is request TDO evaluation from [facility]. The on-call Psychiatrist consulted was Dr. Noe Galvez. The admitting Psychiatrist will be Dr. Noe Galvez. The admitting Diagnosis is Unspecified Psychosis. The Payor source is self. Section II - Integrated Summary Summary:  Patient is a 35year old male seen face to face in the ER. Patient was brought to the ER by his father, who according to nursing staff said he went to the hospital twice in [place] over the last 2-3 days. He was reportedly offered admission and declined. He drove to [place] this morning to get him and bring him to [facility] to get him help. Patient admitted he has been hearing voices for a \"long time. \"  Patient presented as extremely paranoid and irritable. He paced throughout the assessment. He believes there are people trying to harm him and his family. He said \"the antichrist is going to come in here and fuck me and take my power. \"  He then began talking to himself questioning whether he actually has power to take, saying \"but I'm just Yinka, just a regular susan. \"  He reported there are \"so many voices in my damn head that make me feel like God and Beto. \"  He is extremely fearful that someone is going to try to have sex with him. He reported he is very tired but cannot go to sleep because if he falls asleep \"someon is going to fucking fuck me. \"  When asked who would do that he responded \"the antichrist or of the motherfuckers out there. \"  He reported he is not eating, as he has no appetite. When talking about food he said \"I need to become a vegetarian to purge my body of all this bullshit. \"  Patient denied suicidal and homicidal ideation with this clinician. However, when patient arrived in the ER he told nursing staff that he is suicidal with a plan to \"stress myself out til I die. \"  Patient was asked who he lives with his [place] and he stated he wife, then immediately became upset and said Karoline Keller you going to send people there to take her away? \"  He was assured this clinician was not, that the question was to find out his support system. Attempted to discuss voluntary admission with him, however patient was adamant that if he was admitted someone would have sex with him.   This clinician informed him that nobody would try to have sex with him on the 3rd floor, and he responded \"after they pounded all my damn family they left maybe. \"  He then expressed his belief that if he was admitted we would try to remove his brain. Patient is floridly psychotic and not able to take care of himself at this time. He is not willing to be treated voluntarily and does not appear to have capacity to consent to treatment due to his level of psychosis. A TDO evaluation will be requested from [facility]. The patient has not demonstrated mental capacity to provide informed consent. The information is given by the patient and past medical records. The Chief Complaint is mental health problem. The Precipitant Factors are unknown. Previous Hospitalizations: no The patient has not previously been in restraints. Current Psychiatrist and/or  is NA. Lethality Assessment: 
 
The potential for suicide noted by the following: active psychosis, ideation and current substance abuse . The potential for homicide is noted by the following : psychosis. The patient has not been a perpetrator of sexual or physical abuse. There are not pending charges. The patient is felt to be at risk for self harm or harm to others. The attending nurse was advised the patient needs supervision. Section III - Psychosocial 
The patient's overall mood and attitude is anxious and irritable. Feelings of helplessness and hopelessness are not observed. Generalized anxiety is observed by patient's behavior. Panic is not observed. Phobias are not observed. Obsessive compulsive tendencies are not observed.    
 
Section IV - Mental Status Exam 
 The patient's appearance is unkempt. The patient's behavior is agitated and is restless. The patient is not oriented to time, place, and person. The patient's speech is pressured and is loud. The patient's mood is anxious and is irritable. The range of affect is innappropriate. The patient's thought content demonstrates delusions and paranoia. The thought process shows a flight of ideas. The patient's perception demonstrated changes in the following:  auditory hallucinations. The patient's memory shows no evidence of impairment. The patient's appetite is decreased. The patient's sleep has evidence of insomnia. The patient shows no insight. The patient's judgement is psychologically impaired. Section V - Substance Abuse The patient is using substances. The patient is using cannabis by inhalation for 5-10 years with last use on unknown. The patient has experienced the following withdrawal symptoms: N/A. Section VI - Living Arrangements The patient is . The patient lives with a spouse. The patient has no children. The patient does plan to return home upon discharge. The patient does not have legal issues pending. The patient's source of income comes from unknown. Anglican and cultural practices have not been voiced at this time. The patient's greatest support comes from his father and this person will be involved with the treatment. The patient has not been in an event described as horrible or outside the realm of ordinary life experience either currently or in the past. 
The patient has not been a victim of sexual/physical abuse. Section VII - Other Areas of Clinical Concern The highest grade achieved is not assessed with the overall quality of school experience being described as unknown. The patient is currently unemployed and speaks [place] as a primary language. The patient has no communication impairments affecting communication. The patient's preference for learning can be described as: can read and write adequately.   The patient's hearing is normal.  The patient's vision is normal. 
 
 
Otoniel Boateng MA

## 2021-02-06 LAB
CHOLEST SERPL-MCNC: 134 MG/DL
GLUCOSE P FAST SERPL-MCNC: 72 MG/DL (ref 65–100)
HDLC SERPL-MCNC: 61 MG/DL
HDLC SERPL: 2.2 {RATIO} (ref 0–5)
LDLC SERPL CALC-MCNC: 60.8 MG/DL (ref 0–100)
LIPID PROFILE,FLP: NORMAL
SARS-COV-2, COV2: NOT DETECTED
SPECIMEN SOURCE, FCOV2M: NORMAL
TRIGL SERPL-MCNC: 61 MG/DL (ref ?–150)
TSH SERPL DL<=0.05 MIU/L-ACNC: 1.16 UIU/ML (ref 0.36–3.74)
VLDLC SERPL CALC-MCNC: 12.2 MG/DL

## 2021-02-06 PROCEDURE — 36415 COLL VENOUS BLD VENIPUNCTURE: CPT

## 2021-02-06 PROCEDURE — 65220000003 HC RM SEMIPRIVATE PSYCH

## 2021-02-06 PROCEDURE — 74011250637 HC RX REV CODE- 250/637: Performed by: NURSE PRACTITIONER

## 2021-02-06 PROCEDURE — 82947 ASSAY GLUCOSE BLOOD QUANT: CPT

## 2021-02-06 PROCEDURE — 80061 LIPID PANEL: CPT

## 2021-02-06 PROCEDURE — 74011250637 HC RX REV CODE- 250/637: Performed by: PSYCHIATRY & NEUROLOGY

## 2021-02-06 PROCEDURE — 84443 ASSAY THYROID STIM HORMONE: CPT

## 2021-02-06 RX ORDER — RISPERIDONE 0.25 MG/1
0.5 TABLET, FILM COATED ORAL 2 TIMES DAILY
Status: DISCONTINUED | OUTPATIENT
Start: 2021-02-06 | End: 2021-02-07

## 2021-02-06 RX ADMIN — OLANZAPINE 5 MG: 5 TABLET, FILM COATED ORAL at 15:56

## 2021-02-06 RX ADMIN — OLANZAPINE 5 MG: 5 TABLET, FILM COATED ORAL at 09:25

## 2021-02-06 RX ADMIN — RISPERIDONE 0.5 MG: 0.25 TABLET ORAL at 18:14

## 2021-02-06 RX ADMIN — HYDROXYZINE HYDROCHLORIDE 50 MG: 25 TABLET, FILM COATED ORAL at 15:56

## 2021-02-06 NOTE — PROGRESS NOTES
Problem: Falls - Risk of  Goal: *Absence of Falls  Description: Document Shavon Melendez Fall Risk and appropriate interventions in the flowsheet.   2/6/2021 0035 by Pacheco Cardona RN  Outcome: Progressing Towards Goal  Note: Fall Risk Interventions:            Medication Interventions: Teach patient to arise slowly                2/5/2021 2328 by Pacheco Cardona RN  Outcome: Progressing Towards Goal  Note: Fall Risk Interventions:            Medication Interventions: Teach patient to arise slowly                   Problem: Patient Education: Go to Patient Education Activity  Goal: Patient/Family Education  Outcome: Progressing Towards Goal     Problem: Suicide  Goal: *STG: Remains safe in hospital  2/6/2021 0151 by Pacheco Cardona RN  Outcome: Progressing Towards Goal  2/6/2021 0035 by Pacheco Cardona RN  Outcome: Progressing Towards Goal  2/5/2021 2332 by Pacheco Cardona RN  Outcome: Progressing Towards Goal  Goal: *STG: Seeks staff when feelings of self harm or harm towards others arise  2/6/2021 0035 by Pacheco Cardona RN  Outcome: Progressing Towards Goal  2/5/2021 2332 by Pacheco Cardona RN  Outcome: Progressing Towards Goal

## 2021-02-06 NOTE — PROGRESS NOTES
Problem: Falls - Risk of  Goal: *Absence of Falls  Description: Document Clydene Bone Fall Risk and appropriate interventions in the flowsheet.   2/6/2021 0035 by Rimma Contreras RN  Outcome: Progressing Towards Goal  Note: Fall Risk Interventions:     Medication Interventions: Teach patient to arise slowly  2/5/2021 2328 by Rimma Contreras RN  Outcome: Progressing Towards Goal  Note: Fall Risk Interventions:  Medication Interventions: Teach patient to arise slowly  Problem: Patient Education: Go to Patient Education Activity  Goal: Patient/Family Education  Outcome: Progressing Towards Goal     Problem: Suicide  Goal: *STG: Remains safe in hospital  2/6/2021 0035 by Rimma Contreras RN  Outcome: Progressing Towards Goal  2/5/2021 2332 by Rimma Contreras RN  Outcome: Progressing Towards Goal  Goal: *STG: Seeks staff when feelings of self harm or harm towards others arise  2/6/2021 0035 by Rimma Contreras RN  Outcome: Progressing Towards Goal  2/5/2021 2332 by Rimma Contreras RN  Outcome: Progressing Towards Goal

## 2021-02-06 NOTE — PROGRESS NOTES
Problem: Altered Thought Process (Adult/Pediatric)  Goal: *STG: Participates in treatment plan  Outcome: Progressing Towards Goal  Goal: *STG: Remains safe in hospital  Outcome: Progressing Towards Goal  Goal: *STG: Seeks staff when feelings of anxiety and fear arise  Outcome: Progressing Towards Goal  Goal: *STG: Attends activities and groups  Outcome: Progressing Towards Goal  Goal: *STG: Absence of lethality  Outcome: Progressing Towards Goal

## 2021-02-06 NOTE — BH NOTES
GROUP THERAPY PROGRESS NOTE    Patient is participating in the Coping Skills group. Group time: 50 minutes    Personal goal for participation: Discuss and process the difference between the way life is now vs the way patients desire life to be like. Discuss coping skills and ways to reach goals and desired future. Goal orientation: Personal     Group therapy participation: active    Therapeutic interventions reviewed and discussed: Completion of group activity worksheet prompting patients to draw or write the way life currently is vs the way they desire life to be. Discussion on emotions and triggers associated with completing the exercise, coping skills, ways to obtain desired future, behavior and lifestyle changes to be made, and how to access support in the change process. Impression of participation: Pt presented with anxious mood, clear speech, logical thought process, calm and cooperative, interacted appropriately with staff and peers, proper insight and judgement. Pt processed desire to get back to the life he used to live-taking care of himself, focusing on his career, having a strong support system. Pt processed goals for future and processed challenges with asking for help.      Tip Ying MSW

## 2021-02-06 NOTE — PROGRESS NOTES
Problem: Falls - Risk of  Goal: *Absence of Falls  Description: Document Preeti Merchant Fall Risk and appropriate interventions in the flowsheet.   Outcome: Progressing Towards Goal  Note: Fall Risk Interventions:            Medication Interventions: Teach patient to arise slowly
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
## 2021-02-06 NOTE — ED NOTES
Security contacted. Awaiting transport to [facility], 3rd floor for admission.

## 2021-02-06 NOTE — BH NOTES
Admission Note:  Pt is a 35 yr old AA male who presented to the hospital accompanied by his family due to increased depression, suicidal idaeation, delusions and auditory hallucinatons. Pt arrived on the unit via wheelchair accompanied by security at 2049. During admission assessment pt reports increasing depression andg suicidal thoughts with a plan to stress himself out until he is dead. pt denies s/i, h/i and a/v hallucinations at the time of admission. Pt reports depression and constant anxiety and auditory hallucinations of people voices who he thinks want to hurt him and his family. Pt reports a significant psych hx with multiple inpatient admissions. Pt reports he has been noncompliant with previous prescribed medications due to lack of resources. Denies medical hx. Denies allergies. Reports drinking 1-2 shot of liquor a couple time a week. Denies withdrawm symptoms at this time. Pt reports smoking cannibus daily before admission. Also report smoking 1PPD of ciggarettes. Nicotine replacement offered and accepted by pt. pt denies being up to date on vaccinations. He refused flu, tb screening, pneumonia vaccine at this time. COVID rapid test is negative. COVID PCR is pending. Pt belonging checked and secured by [employer]. Valuables checked and secured by security. pt was unable to be oriented to the unit stating he was tired. Pt reports poor sleep and appetite before admission. Nutrition and fluids provided. 2146 Call placed to Silvia Zarate for admitting orders. Orders given and initated at that time. Pt is on q15 min safety checks per order. Pt was observed in bed eyes closed shortly after admission with no signs of distress. Maintained on q15 min safety checks.

## 2021-02-06 NOTE — ED NOTES
Pt questioning behavorial health admission. Pt beginning to escalate as evidenced by increasing tone of voice. Quickly deescalated by this RN by providing reassurance and informing pt on plan of care.

## 2021-02-06 NOTE — ED NOTES
Pt transported to [facility], Room 312 via w/c accompanied by Ritesh Walker and RAMONITA in stable condition. TDO in hand, along w/ personal belongings.

## 2021-02-06 NOTE — PROGRESS NOTES
Patient alert, cooperative, compliant with vital signs and medication. Patient endorses anxiety, denies depression, denies SI, HI, AH, VH. Patient c/o occasional \"strange thoughts\" or \"radio in my head\". Denies thoughts of self harm. Patient states he feels much better since getting a good night sleep. Patient to dayroom for meals and group session. Patient also spent time in dayroom watching television and doing crossword puzzles. Patient given prn atarax 50 mg po x 1 for c/o anxiety and prn zyprexa 5 mg po x 2.
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
## 2021-02-06 NOTE — ED NOTES
TRANSFER - OUT REPORT:    Verbal report given to Cletus Bumpers, RN (name) on Glenna Manuel  being transferred to [facility], Room 312 (unit) for routine progression of care       Report consisted of patients Situation, Background, Assessment and   Recommendations(SBAR). Information from the following report(s) SBAR, Kardex, ED Summary, Intake/Output, MAR and Recent Results, VS, and Pain was reviewed with the receiving nurse. Lines:       Opportunity for questions and clarification was provided.       Patient transported with:  [address] [place] [address] Police Department  TDO in hand
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
## 2021-02-06 NOTE — PROGRESS NOTES
Problem: Falls - Risk of  Goal: *Absence of Falls  Description: Document Rosa Blood Fall Risk and appropriate interventions in the flowsheet.   Outcome: Progressing Towards Goal  Note: Fall Risk Interventions:  Medication Interventions: Teach patient to arise slowly  Problem: Suicide  Goal: *STG: Remains safe in hospital  Outcome: Progressing Towards Goal  Goal: *STG: Seeks staff when feelings of self harm or harm towards others arise  Outcome: Progressing Towards Goal

## 2021-02-07 PROBLEM — F20.9 SCHIZOPHRENIA (HCC): Status: ACTIVE | Noted: 2021-02-05

## 2021-02-07 LAB
ANION GAP SERPL CALC-SCNC: 10 MMOL/L (ref 5–15)
BUN SERPL-MCNC: 12 MG/DL (ref 6–20)
BUN/CREAT SERPL: 10 (ref 12–20)
CALCIUM SERPL-MCNC: 8.8 MG/DL (ref 8.5–10.1)
CHLORIDE SERPL-SCNC: 107 MMOL/L (ref 97–108)
CO2 SERPL-SCNC: 27 MMOL/L (ref 21–32)
CREAT SERPL-MCNC: 1.22 MG/DL (ref 0.7–1.3)
GLUCOSE SERPL-MCNC: 84 MG/DL (ref 65–100)
POTASSIUM SERPL-SCNC: 3.7 MMOL/L (ref 3.5–5.1)
SODIUM SERPL-SCNC: 144 MMOL/L (ref 136–145)

## 2021-02-07 PROCEDURE — 65220000003 HC RM SEMIPRIVATE PSYCH

## 2021-02-07 PROCEDURE — 80048 BASIC METABOLIC PNL TOTAL CA: CPT

## 2021-02-07 PROCEDURE — 74011250637 HC RX REV CODE- 250/637: Performed by: PSYCHIATRY & NEUROLOGY

## 2021-02-07 PROCEDURE — 36415 COLL VENOUS BLD VENIPUNCTURE: CPT

## 2021-02-07 PROCEDURE — 99233 SBSQ HOSP IP/OBS HIGH 50: CPT | Performed by: PSYCHIATRY & NEUROLOGY

## 2021-02-07 PROCEDURE — 74011250637 HC RX REV CODE- 250/637: Performed by: NURSE PRACTITIONER

## 2021-02-07 RX ORDER — RISPERIDONE 1 MG/1
1 TABLET, FILM COATED ORAL 2 TIMES DAILY
Status: DISCONTINUED | OUTPATIENT
Start: 2021-02-07 | End: 2021-02-08 | Stop reason: HOSPADM

## 2021-02-07 RX ADMIN — RISPERIDONE 1 MG: 1 TABLET ORAL at 16:47

## 2021-02-07 RX ADMIN — TRAZODONE HYDROCHLORIDE 50 MG: 50 TABLET ORAL at 21:21

## 2021-02-07 RX ADMIN — RISPERIDONE 0.5 MG: 0.25 TABLET ORAL at 09:01

## 2021-02-07 RX ADMIN — HYDROXYZINE HYDROCHLORIDE 50 MG: 25 TABLET, FILM COATED ORAL at 05:05

## 2021-02-07 RX ADMIN — HYDROXYZINE HYDROCHLORIDE 50 MG: 25 TABLET, FILM COATED ORAL at 13:33

## 2021-02-07 NOTE — PROGRESS NOTES
[facility] Admission Pharmacy Medication Reconciliation     Information obtained from: patient interview, [address], [place]   RxQuery data available1: no recent information    Comments:    1) Patient stated he takes no prescription medications. OTC Tylenol PRN. 2) Medication changes (since last review): Added  none  Removed  Risperidone disintegrating tab 0.5 mg po BID  Fluticasone nasal spray  Triamcinolone 55 mcg nasal inhaler  Adjusted  None    3) [place] Prescription Monitoring Program ([facility]) was accesssed and no record of controlled substances dispensed in past 2 years in [address] [place] [address] benefit data reflects medications filled and processed through the [facility], however                this data does NOT capture whether the medication was picked up or is currently being taken by the patient. Past Medical History/Disease States:  Past Medical History:   Diagnosis Date    Tobacco abuse          Patient allergies: Allergies as of 02/05/2021    (No Known Allergies)         Prior to Admission Medications   Prescriptions Last Dose Informant Patient Reported? Taking?   acetaminophen (TYLENOL EXTRA STRENGTH) 500 mg tablet Not Taking at Unknown time Self No No   Sig: Take 2 Tabs by mouth every six (6) hours as needed for Pain.             Thank you,  Ajit Vargas, [facility]

## 2021-02-07 NOTE — PROGRESS NOTES
Laboratory monitoring for mood stabilizer and antipsychotics:      Recommended baseline monitoring has NOT been completed based on this patient's current medication regimen. The following labs have been ordered to complete baseline monitoring: hemoglobin A1c       The patient is currently taking the following medication(s):   Current Facility-Administered Medications   Medication Dose Route Frequency    risperiDONE (RisperDAL) tablet 1 mg  1 mg Oral BID    nicotine (NICODERM CQ) 21 mg/24 hr patch 1 Patch  1 Patch TransDERmal DAILY       Height, Weight, BMI Estimation  Estimated body mass index is 19.12 kg/m² as calculated from the following:    Height as of this encounter: 190.5 cm (75\"). Weight as of this encounter: 69.4 kg (153 lb). Renal Function, Hepatic Function and Chemistry  Estimated Creatinine Clearance: 87.7 mL/min (based on SCr of 1.22 mg/dL). Lab Results   Component Value Date/Time    Sodium 144 02/07/2021 04:38 AM    Potassium 3.7 02/07/2021 04:38 AM    Chloride 107 02/07/2021 04:38 AM    CO2 27 02/07/2021 04:38 AM    Anion gap 10 02/07/2021 04:38 AM    Glucose 84 02/07/2021 04:38 AM    Glucose 72 02/06/2021 05:49 AM    BUN 12 02/07/2021 04:38 AM    Creatinine 1.22 02/07/2021 04:38 AM    BUN/Creatinine ratio 10 (L) 02/07/2021 04:38 AM    GFR est AA >60 02/07/2021 04:38 AM    GFR est non-AA >60 02/07/2021 04:38 AM    Calcium 8.8 02/07/2021 04:38 AM    ALT (SGPT) 27 02/05/2021 01:47 PM    Alk.  phosphatase 67 02/05/2021 01:47 PM    Protein, total 7.9 02/05/2021 01:47 PM    Albumin 4.5 02/05/2021 01:47 PM    Globulin 3.4 02/05/2021 01:47 PM    A-G Ratio 1.3 02/05/2021 01:47 PM    Bilirubin, total 0.8 02/05/2021 01:47 PM       Lab Results   Component Value Date/Time    Glucose 84 02/07/2021 04:38 AM    Glucose 72 02/06/2021 05:49 AM       No results found for: HBA1C, HGBE8, VWO6KLJL    Hematology  Lab Results   Component Value Date/Time    WBC 9.4 02/05/2021 01:47 PM    HGB 14.4 02/05/2021 01:47 PM HCT 40.7 02/05/2021 01:47 PM    PLATELET 756 97/41/2446 01:47 PM    MCV 87.3 02/05/2021 01:47 PM       Lipids  Lab Results   Component Value Date/Time    Cholesterol, total 134 02/06/2021 05:49 AM    HDL Cholesterol 61 02/06/2021 05:49 AM    LDL, calculated 60.8 02/06/2021 05:49 AM    Triglyceride 61 02/06/2021 05:49 AM    CHOL/HDL Ratio 2.2 02/06/2021 05:49 AM       Thyroid Function    Lab Results   Component Value Date/Time    TSH 1.16 02/06/2021 05:49 AM     Vitals  Visit Vitals  /86 (BP 1 Location: Left upper arm, BP Patient Position: Sitting)   Pulse 77   Temp 98.1 °F (36.7 °C)   Resp 18   Ht 190.5 cm (75\")   Wt 69.4 kg (153 lb)   SpO2 100%   BMI 19.12 kg/m²       Mireille Can [place]  [phone] (pharmacy)
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
## 2021-02-07 NOTE — PROGRESS NOTES
Problem: Falls - Risk of  Goal: *Absence of Falls  Description: Document Shavon Lobe Fall Risk and appropriate interventions in the flowsheet.   Outcome: Progressing Towards Goal  Note: Fall Risk Interventions:            Medication Interventions: Teach patient to arise slowly                   Problem: Suicide  Goal: *STG: Remains safe in hospital  Outcome: Progressing Towards Goal

## 2021-02-07 NOTE — PROGRESS NOTES
Problem: Discharge Planning  Goal: *Knowledge of medication management  Outcome: Progressing Towards Goal  Note: Patient verbalizes understanding of medication regimen. Patient is taking medications as prescribed. Goal: *Knowledge of discharge instructions  Outcome: Progressing Towards Goal  Note: Patient verbalizes understanding of goals for treatment and safe discharge. Problem: Discharge Planning  Goal: *Discharge to safe environment  Outcome: Not Progressing Towards Goal  Note: Patient states returning home is not an option due to current martial challenges. Patient to explore other discharge options. Patient states he may be able to live with his father in [facility] post discharge.

## 2021-02-07 NOTE — BH NOTES
GROUP THERAPY PROGRESS NOTE    Patient is participating in Coping Skills group. Group time: 1 hour    Personal goal for participation: Learn coping skills to improve mental health, reduce stress and work through uncomfortable emotions    Goal orientation: Personal    Group therapy participation: active    Therapeutic interventions reviewed and discussed: Group discussion on ways patients are coping with mental health needs, stress and uncomfortable emotions. Discussion regarding alternative positive coping skills using each letter of the alphabet, resulting in patients having a list of 26+ positive coping skills to access. Impression of participation: Pt presented with euthymic mood, clear speech, logical thought process. Pt calm and cooperative, interacted appropriately with staff and peers. Pt completed worksheet and was actively engaged in conversation. Pt able to name numerous positive coping skills including going to therapy, deep breathing exercises, examining his feelings, finding a safe place, leaving a situation when overwhelmed and playing sports. Pt processed new coping skills to try upon discharge.     YURIY Allison

## 2021-02-07 NOTE — PROGRESS NOTES
Problem: Falls - Risk of  Goal: *Absence of Falls  Description: Document Green Salvia Fall Risk and appropriate interventions in the flowsheet. Outcome: Progressing Towards Goal  Note: Fall Risk Interventions:  Medication Interventions: Teach patient to arise slowly   Problem: Patient Education: Go to Patient Education Activity  Goal: Patient/Family Education  Outcome: Progressing Towards Goal  Problem: Suicide  Goal: *STG: Remains safe in hospital  Outcome: Progressing Towards Goal  Goal: *STG: Seeks staff when feelings of self harm or harm towards others arise  Outcome: Progressing Towards Goal  Goal: *STG/LTG: Complies with medication therapy  Outcome: Progressing Towards Goal  Problem: Suicide  Goal: *STG: Remains safe in hospital  Outcome: Progressing Towards Goal     Problem: Altered Thought Process (Adult/Pediatric)  Goal: *STG: Remains safe in hospital  Outcome: Progressing Towards Goal  Pt is visible on the unit. Social with peers. Denies s/I, h/I and visual hallucinations. Does report auditory hallucinations that are not command in nature. He reports hearing this things that are strange. Pt would not respond further. Pt spent the evening in the lounge with peers watching sports. Minimal interactions noted. Pt reports feeling better since he was able to \"get some sleep\" last night. Pt remained calm and in control. Mood continues to be sad and depressed. Will continue to monitor. 0500 Pt observed in bed eyes closed with even unlabored breathing. 0615 In bed appears to be sleeping for 8 hrs.

## 2021-02-08 VITALS
DIASTOLIC BLOOD PRESSURE: 97 MMHG | TEMPERATURE: 97.7 F | HEIGHT: 75 IN | BODY MASS INDEX: 19.02 KG/M2 | RESPIRATION RATE: 18 BRPM | WEIGHT: 153 LBS | OXYGEN SATURATION: 100 % | HEART RATE: 80 BPM | SYSTOLIC BLOOD PRESSURE: 150 MMHG

## 2021-02-08 LAB
EST. AVERAGE GLUCOSE BLD GHB EST-MCNC: 103 MG/DL
HBA1C MFR BLD: 5.2 % (ref 4–5.6)

## 2021-02-08 PROCEDURE — 74011250637 HC RX REV CODE- 250/637: Performed by: PSYCHIATRY & NEUROLOGY

## 2021-02-08 PROCEDURE — 36415 COLL VENOUS BLD VENIPUNCTURE: CPT

## 2021-02-08 PROCEDURE — 83036 HEMOGLOBIN GLYCOSYLATED A1C: CPT

## 2021-02-08 RX ORDER — HYDROXYZINE 50 MG/1
50 TABLET, FILM COATED ORAL
Qty: 30 TAB | Refills: 0 | Status: SHIPPED | OUTPATIENT
Start: 2021-02-08 | End: 2021-02-18

## 2021-02-08 RX ORDER — RISPERIDONE 1 MG/1
1 TABLET, FILM COATED ORAL 2 TIMES DAILY
Qty: 60 TAB | Refills: 0 | Status: SHIPPED | OUTPATIENT
Start: 2021-02-08

## 2021-02-08 RX ORDER — TRAZODONE HYDROCHLORIDE 50 MG/1
50 TABLET ORAL
Qty: 30 TAB | Refills: 0 | Status: SHIPPED | OUTPATIENT
Start: 2021-02-08

## 2021-02-08 RX ADMIN — RISPERIDONE 1 MG: 1 TABLET ORAL at 08:26

## 2021-02-08 NOTE — SUICIDE SAFETY PLAN
SAFETY PLAN    A suicide Safety Plan is a document that supports someone when they are having thoughts of suicide. Warning Signs that indicate a suicidal crisis may be developing: What (situations, thoughts, feelings, body sensations, behaviors, etc.) do you experience that lets you know you are beginning to think about suicide? 1. Anxiety  2. Paranoid  3. Shortness of breath    Internal Coping Strategies:  What things can I do (relaxation techniques, hobbies, physical activities, etc.) to take my mind off my problems without contacting another person? 1. Deep breathing  2. exercise  3. Listen to music    People and social settings that provide distraction: Who can I call or where can I go to distract me? 1. Name: Rachael Norris Phone: [phone]  2. Name: Mayi Leyva   Phone: [phone]   3. Place: Outside            4. Place: [address] [place] [address] whom I can ask for help: Who can I call when I need help - for example, friends, family, clergy, someone else? 1. Name: Sherald Saint        Phone: [phone]  2. Name: Maged Olmedo Phone: [phone]  3. Name: Cameron Wakefield Phone: [phone]    Professionals or [address] [place] [address] I can contact during a crisis: Who can I call for help - for example, my doctor, my psychiatrist, my psychologist, a mental health provider, a suicide hotline? 1. Clinician Name: Taz Valencia   Phone: [phone]      Clinician Pager or Emergency Contact #: [phone]    2. Suicide Prevention Lifeline: 4-810-171-TALK (3773)    3. [address] [place] [address] Emergency Services -  for example, [facility] suicide hotline, [facility] Hotline: [facility]      Emergency Services Address: [address] [place] [address] [address], [place], [address] [facility]      Emergency Services Phone: [phone]     Making the environment safe: How can I make my environment (house/apartment/living space) safer? For example, can I remove guns, medications, and other items?   1. Cleaning up the area  2.  Working on my vehicles

## 2021-02-08 NOTE — BH NOTES
Behavioral Health Transition Record to Provider    Patient Name: Kamilla Santana  YOB: 1990  Medical Record Number: [id number]  Date of Admission: 2/5/2021  Date of Discharge: 2/8/21    Attending Provider: No att. providers found  Discharging Provider: Dr. Harish Templeton MD  To contact this individual call [phone] and ask the  to page. If unavailable, ask to be transferred to [facility] Provider on call. [facility] Provider will be available on call 24/7 and during holidays. Primary Care Provider: None    No Known Allergies    Reason for Admission: Pt chart review, pt brought to ED by father due to increased bizarre behavior. Current stressors include lack of sleep and relationship challenges with wife. Pt denies previous psych hx and dx. This is incongruent with nursing notes. Pt confirms AH/VH for 4-5 years, thoughts of hurting self, denies HI. Pt states he feels sad and like he lost his way in life. Pt seeking medication and therapy to stabilize inpatient.     Admission Diagnosis: Psychosis ([facility] [address] [F29]    * No surgery found *    Results for orders placed or performed during the hospital encounter of 02/05/21   COVID-19 RAPID TEST   Result Value Ref Range    Specimen source Nasopharyngeal      COVID-19 rapid test Not detected NOTD     SARS-COV-2, PCR    Specimen: Nasopharyngeal   Result Value Ref Range    Specimen source Nasopharyngeal      SARS-CoV-2 Not detected NOTD     CBC WITH AUTOMATED DIFF   Result Value Ref Range    WBC 9.4 4.1 - 11.1 K/uL    RBC 4.66 4.10 - 5.70 M/uL    HGB 14.4 12.1 - 17.0 g/dL    HCT 40.7 36.6 - 50.3 %    MCV 87.3 80.0 - 99.0 FL    MCH 30.9 26.0 - 34.0 PG    MCHC 35.4 30.0 - 36.5 g/dL    RDW 11.9 11.5 - 14.5 %    PLATELET 749 389 - 284 K/uL    MPV 9.4 8.9 - 12.9 FL    NRBC 0.0 0  WBC    ABSOLUTE NRBC 0.00 0.00 - 0.01 K/uL    NEUTROPHILS 73 32 - 75 %    LYMPHOCYTES 16 12 - 49 %    MONOCYTES 11 5 - 13 %    EOSINOPHILS 0 0 - 7 % BASOPHILS 0 0 - 1 %    IMMATURE GRANULOCYTES 0 0.0 - 0.5 %    ABS. NEUTROPHILS 6.9 1.8 - 8.0 K/UL    ABS. LYMPHOCYTES 1.5 0.8 - 3.5 K/UL    ABS. MONOCYTES 1.0 0.0 - 1.0 K/UL    ABS. EOSINOPHILS 0.0 0.0 - 0.4 K/UL    ABS. BASOPHILS 0.0 0.0 - 0.1 K/UL    ABS. IMM. GRANS. 0.0 0.00 - 0.04 K/UL    DF AUTOMATED     METABOLIC PANEL, COMPREHENSIVE   Result Value Ref Range    Sodium 132 (L) 136 - 145 mmol/L    Potassium 3.0 (L) 3.5 - 5.1 mmol/L    Chloride 95 (L) 97 - 108 mmol/L    CO2 21 21 - 32 mmol/L    Anion gap 16 (H) 5 - 15 mmol/L    Glucose 78 65 - 100 mg/dL    BUN 10 6 - 20 MG/DL    Creatinine 1.22 0.70 - 1.30 MG/DL    BUN/Creatinine ratio 8 (L) 12 - 20      GFR est AA >60 >60 ml/min/1.73m2    GFR est non-AA >60 >60 ml/min/1.73m2    Calcium 8.9 8.5 - 10.1 MG/DL    Bilirubin, total 0.8 0.2 - 1.0 MG/DL    ALT (SGPT) 27 12 - 78 U/L    AST (SGOT) 45 (H) 15 - 37 U/L    Alk.  phosphatase 67 45 - 117 U/L    Protein, total 7.9 6.4 - 8.2 g/dL    Albumin 4.5 3.5 - 5.0 g/dL    Globulin 3.4 2.0 - 4.0 g/dL    A-G Ratio 1.3 1.1 - 2.2     ETHYL ALCOHOL   Result Value Ref Range    ALCOHOL(ETHYL),SERUM <10 <10 MG/DL   URINALYSIS W/ REFLEX CULTURE    Specimen: Urine   Result Value Ref Range    Color YELLOW/STRAW      Appearance CLEAR CLEAR      Specific gravity <1.005 1.003 - 1.030    pH (UA) 6.5 5.0 - 8.0      Protein Negative NEG mg/dL    Glucose Negative NEG mg/dL    Ketone 40 (A) NEG mg/dL    Bilirubin Negative NEG      Blood Negative NEG      Urobilinogen 0.2 0.2 - 1.0 EU/dL    Nitrites Negative NEG      Leukocyte Esterase Negative NEG      WBC 0-4 0 - 4 /hpf    RBC 0-5 0 - 5 /hpf    Epithelial cells FEW FEW /lpf    Bacteria Negative NEG /hpf    UA:UC IF INDICATED CULTURE NOT INDICATED BY UA RESULT CNI     DRUG SCREEN, URINE   Result Value Ref Range    AMPHETAMINES Negative NEG      BARBITURATES Negative NEG      BENZODIAZEPINES Negative NEG      COCAINE Negative NEG      METHADONE Negative NEG      OPIATES Negative NEG PCP(PHENCYCLIDINE) Negative NEG      THC (TH-CANNABINOL) Positive (A) NEG      Drug screen comment (NOTE)    SARS-COV-2   Result Value Ref Range    SARS-CoV-2 Please find results under separate order     LIPID PANEL   Result Value Ref Range    LIPID PROFILE          Cholesterol, total 134 <200 MG/DL    Triglyceride 61 <150 MG/DL    HDL Cholesterol 61 MG/DL    LDL, calculated 60.8 0 - 100 MG/DL    VLDL, calculated 12.2 MG/DL    CHOL/HDL Ratio 2.2 0.0 - 5.0     TSH 3RD GENERATION   Result Value Ref Range    TSH 1.16 0.36 - 3.74 uIU/mL   GLUCOSE, FASTING   Result Value Ref Range    Glucose 72 65 - 267 MG/DL   METABOLIC PANEL, BASIC   Result Value Ref Range    Sodium 144 136 - 145 mmol/L    Potassium 3.7 3.5 - 5.1 mmol/L    Chloride 107 97 - 108 mmol/L    CO2 27 21 - 32 mmol/L    Anion gap 10 5 - 15 mmol/L    Glucose 84 65 - 100 mg/dL    BUN 12 6 - 20 MG/DL    Creatinine 1.22 0.70 - 1.30 MG/DL    BUN/Creatinine ratio 10 (L) 12 - 20      GFR est AA >60 >60 ml/min/1.73m2    GFR est non-AA >60 >60 ml/min/1.73m2    Calcium 8.8 8.5 - 10.1 MG/DL   HEMOGLOBIN A1C WITH EAG   Result Value Ref Range    Hemoglobin A1c 5.2 4.0 - 5.6 %    Est. average glucose 103 mg/dL       Immunizations administered during this encounter: There is no immunization history on file for this patient. Screening for Metabolic Disorders for Patients on Antipsychotic Medications  (Data obtained from the EMR)    Estimated Body Mass Index  Estimated body mass index is 19.12 kg/m² as calculated from the following:    Height as of this encounter: 6' 3\" (1.905 m). Weight as of this encounter: 69.4 kg (153 lb).      Vital Signs/Blood Pressure  Visit Vitals  BP (!) 150/97 (BP 1 Location: Right upper arm, BP Patient Position: Sitting)   Pulse 80   Temp 97.7 °F (36.5 °C)   Resp 18   Ht 6' 3\" (1.905 m)   Wt 69.4 kg (153 lb)   SpO2 100%   BMI 19.12 kg/m²       Blood Glucose/Hemoglobin A1c  Lab Results   Component Value Date/Time    Glucose 84 02/07/2021 04:38 AM    Glucose 72 02/06/2021 05:49 AM       Lab Results   Component Value Date/Time    Hemoglobin A1c 5.2 02/08/2021 04:35 AM        Lipid Panel  Lab Results   Component Value Date/Time    Cholesterol, total 134 02/06/2021 05:49 AM    HDL Cholesterol 61 02/06/2021 05:49 AM    LDL, calculated 60.8 02/06/2021 05:49 AM    Triglyceride 61 02/06/2021 05:49 AM    CHOL/HDL Ratio 2.2 02/06/2021 05:49 AM        Discharge Diagnosis: please refer to physician's discharge summary      Discharge Plan: The patient Claudia Brown exhibits the ability to control behavior in a less restrictive environment. Patient's level of functioning is improving. No assaultive/destructive behavior has been observed for the past 24 hours. No suicidal/homicidal threat or behavior has been observed for the past 24 hours. There is no evidence of serious medication side effects. Patient has not been in physical or protective restraints for at least the past 24 hours. If weapons involved, how are they secured? No weapons    Is patient aware of and in agreement with discharge plan? Patient agrees to discharge and follow up with [facility] for medication:  Prescriptions sent to [facility]    Copy of discharge instructions to provider?:  Fax to [facility] for transportation home:      Keep all follow up appointments as scheduled, continue to take prescribed medications per physician instructions. Mental health crisis number:  [phone] or your local mental health crisis line number at [phone]      Discharge Medication List and Instructions:   Discharge Medication List as of 2/8/2021 11:38 AM      START taking these medications    Details   risperiDONE (RisperDAL) 1 mg tablet Take 1 Tab by mouth two (2) times a day. Indications: schizophrenia, Normal, Disp-60 Tab, R-0      traZODone (DESYREL) 50 mg tablet Take 1 Tab by mouth nightly as needed for Sleep (For insomnia).  Indications: insomnia associated with depression, Normal, Disp-30 Tab, R-0      hydrOXYzine HCL (ATARAX) 50 mg tablet Take 1 Tab by mouth three (3) times daily as needed for Anxiety for up to 10 days. Indications: anxious, Normal, Disp-30 Tab, R-0         CONTINUE these medications which have NOT CHANGED    Details   acetaminophen (TYLENOL EXTRA STRENGTH) 500 mg tablet Take 2 Tabs by mouth every six (6) hours as needed for Pain., Print, Disp-40 Tab, R-0             Unresulted Labs (24h ago, onward)    None        To obtain results of studies pending at discharge, please contact N/A    Follow-up Information     Follow up With Specialties Details Why Contact Info    None    None ([phone]) Patient stated that they have no PCP      [facility] Please call for rapid access intake appointment for ongoing mental health case management, therapy and medication management. You contact information was left on the voicemail and they should call you today. Call Tuesday if you do not receive a call to [address]  [place], [address]  [phone] 24/7 Crisis          Advanced Directive:   Does the patient have an appointed surrogate decision maker? No  Does the patient have a Medical Advance Directive? No  Does the patient have a Psychiatric Advance Directive? No  If the patient does not have a surrogate or Medical Advance Directive AND Psychiatric Advance Directive, the patient was offered information on these advance directives Patient declined to complete    Patient Instructions: Please continue all medications until otherwise directed by physician. Tobacco Cessation Discharge Plan:   Is the patient a smoker and needs referral for smoking cessation? Yes  Patient referred to the following for smoking cessation with an appointment? Yes     Patient was offered medication to assist with smoking cessation at discharge? Yes  Was education for smoking cessation added to the discharge instructions? Yes    Alcohol/Substance Abuse Discharge Plan:   Does the patient have a history of substance/alcohol abuse and requires a referral for treatment? Yes  Patient referred to the following for substance/alcohol abuse treatment with an appointment? Yes  Patient was offered medication to assist with alcohol cessation at discharge? Yes  Was education for substance/alcohol abuse added to discharge instructions? Yes    Patient discharged to Home; discussed with patient/caregiver and provided to the patient/caregiver either in hard copy or electronically.

## 2021-02-08 NOTE — DISCHARGE SUMMARY
PSYCHIATRIC DISCHARGE SUMMARY         IDENTIFICATION:    Patient Name  Jasper Holter   Date of Birth 1990   CSN [id number]   Medical Record Number  [id number]      Age  35 y.o. PCP None   Admit date:  2/5/2021    Discharge date: 2/8/2021   Room Number  172/72  @ [facility]   Date of Service  2/8/2021            TYPE OF DISCHARGE: RELEASED BY THE [facility] COURT               CONDITION AT DISCHARGE: good       PROVISIONAL & DISCHARGE DIAGNOSES:    Problem List  Never Reviewed          Codes Class    * (Principal) Schizophrenia ([facility] [address] ICD-10-CM: F20.9  ICD-9-CM: 295.90         Suicidal ideation ICD-10-CM: R45.851  ICD-9-CM: V62.84               Active Hospital Problems    *Schizophrenia ([facility] [address]        DISCHARGE DIAGNOSIS:   Axis I:  SEE ABOVE  Axis II: SEE ABOVE  Axis III: SEE ABOVE  Axis IV:  lack of structure  Axis V:  <50 on admission, 55+ on discharge     CC & HISTORY OF PRESENT ILLNESS:  35 y.o. Pema Martinez no known psychiatric history, was brought to emergency department by his father for concerns for strange behavior. Lynn Mancini reports that he is \"freaking out\" all the time, feels like he is hearing voices and hearing music, thinks people are out to get him. [facility] feels very confused and scared.  Symptoms have been worsening for weeks.    Denies SI or HI.      SOCIAL HISTORY:    Social History     Socioeconomic History    Marital status: SINGLE     Spouse name: Not on file    Number of children: Not on file    Years of education: Not on file    Highest education level: Not on file   Occupational History    Not on file   Social Needs    Financial resource strain: Not on file    Food insecurity     Worry: Not on file     Inability: Not on file    Transportation needs     Medical: Not on file     Non-medical: Not on file   Tobacco Use    Smoking status: Current Every Day Smoker     Packs/day: 0.50     Years: 10.00     Pack years: 5.00     Types: Cigarettes    Smokeless tobacco: Never Used Substance and Sexual Activity    Alcohol use: Yes    Drug use: No    Sexual activity: Yes   Lifestyle    Physical activity     Days per week: Not on file     Minutes per session: Not on file    Stress: Not on file   Relationships    Social connections     Talks on phone: Not on file     Gets together: Not on file     Attends Zoroastrian service: Not on file     Active member of club or organization: Not on file     Attends meetings of clubs or organizations: Not on file     Relationship status: Not on file    Intimate partner violence     Fear of current or ex partner: Not on file     Emotionally abused: Not on file     Physically abused: Not on file     Forced sexual activity: Not on file   Other Topics Concern    Not on file   Social History Narrative    ** Merged History Encounter **           FAMILY HISTORY:   No family history on file. HOSPITALIZATION COURSE:    Beverlie Essex was admitted to the inpatient psychiatric unit [facility] for acute psychiatric stabilization in regards to symptomatology as described in the HPI above. The differential diagnosis at time of admission included: schizophrenia vs substance induced psychotic disorder schizoaffective vs bipolar vs adjustment disorder. While on the unit Beverlie Essex was involved in individual, group, occupational and milieu therapy. Psychiatric medications were adjusted during this hospitalization. Beverlie Essex demonstrated a progressive improvement in overall condition. Much of patient's initial presentation appeared to be related to situational stressors, effects of medication non-compliance, drugs of abuse, and psychological factors. Please see individual progress notes for more specific details regarding patient's hospitalization course. Beverlie Essex reports feeling well and moods are good. Denies SI/HI/AH/VH. No aggression or violence. Appropriately interactive and aware. Tolerating medications well. Eating and sleeping fairly. Patient with request for discharge today. There are no grounds to commit so the courts released him to his own recognizance. At time of discharge, Laymon Lover is without significant problems of depression, psychosis, or vielka. Patient free of suicidal and homicidal ideations (appears to be at very low risk of suicide or homicide) and reports many positive predictive factors in terms of not attempting suicide or homicide. Overall presentation at time of discharge is most consistent with the diagnosis of Paranoid Schizophrenia. Patient has maximized benefit to be derived from acute inpatient psychiatric treatment. All members of the treatment team concur with each other in regards to plans for discharge today. Patient and family are aware and in agreement with discharge and discharge plan.          LABS AND IMAGAING:    Labs Reviewed   METABOLIC PANEL, COMPREHENSIVE - Abnormal; Notable for the following components:       Result Value    Sodium 132 (*)     Potassium 3.0 (*)     Chloride 95 (*)     Anion gap 16 (*)     BUN/Creatinine ratio 8 (*)     AST (SGOT) 45 (*)     All other components within normal limits   URINALYSIS W/ REFLEX CULTURE - Abnormal; Notable for the following components:    Ketone 40 (*)     All other components within normal limits   DRUG SCREEN, URINE - Abnormal; Notable for the following components:    THC (TH-CANNABINOL) Positive (*)     All other components within normal limits   METABOLIC PANEL, BASIC - Abnormal; Notable for the following components:    BUN/Creatinine ratio 10 (*)     All other components within normal limits   COVID-19 RAPID TEST   SARS-COV-2, PCR   CBC WITH AUTOMATED DIFF   ETHYL ALCOHOL   SARS-COV-2   LIPID PANEL   TSH 3RD GENERATION   GLUCOSE, FASTING   HEMOGLOBIN A1C WITH EAG     No results found for: DS35, PHEN, PHENO, PHENT, DILF, DS39, PHENY, PTN, VALF2, VALAC, VALP, VALPR, DS6, CRBAM, CRBAMP, CARB2, XCRBAM  Admission on 02/05/2021   Component Date Value Ref Range Status    WBC 02/05/2021 9.4  4.1 - 11.1 K/uL Final    RBC 02/05/2021 4.66  4.10 - 5.70 M/uL Final    HGB 02/05/2021 14.4  12.1 - 17.0 g/dL Final    HCT 02/05/2021 40.7  36.6 - 50.3 % Final    MCV 02/05/2021 87.3  80.0 - 99.0 FL Final    MCH 02/05/2021 30.9  26.0 - 34.0 PG Final    MCHC 02/05/2021 35.4  30.0 - 36.5 g/dL Final    RDW 02/05/2021 11.9  11.5 - 14.5 % Final    PLATELET 37/48/2271 339  150 - 400 K/uL Final    MPV 02/05/2021 9.4  8.9 - 12.9 FL Final    NRBC 02/05/2021 0.0  0  WBC Final    ABSOLUTE NRBC 02/05/2021 0.00  0.00 - 0.01 K/uL Final    NEUTROPHILS 02/05/2021 73  32 - 75 % Final    LYMPHOCYTES 02/05/2021 16  12 - 49 % Final    MONOCYTES 02/05/2021 11  5 - 13 % Final    EOSINOPHILS 02/05/2021 0  0 - 7 % Final    BASOPHILS 02/05/2021 0  0 - 1 % Final    IMMATURE GRANULOCYTES 02/05/2021 0  0.0 - 0.5 % Final    ABS. NEUTROPHILS 02/05/2021 6.9  1.8 - 8.0 K/UL Final    ABS. LYMPHOCYTES 02/05/2021 1.5  0.8 - 3.5 K/UL Final    ABS. MONOCYTES 02/05/2021 1.0  0.0 - 1.0 K/UL Final    ABS. EOSINOPHILS 02/05/2021 0.0  0.0 - 0.4 K/UL Final    ABS. BASOPHILS 02/05/2021 0.0  0.0 - 0.1 K/UL Final    ABS. IMM.  GRANS. 02/05/2021 0.0  0.00 - 0.04 K/UL Final    DF 02/05/2021 AUTOMATED    Final    Sodium 02/05/2021 132* 136 - 145 mmol/L Final    Potassium 02/05/2021 3.0* 3.5 - 5.1 mmol/L Final    Chloride 02/05/2021 95* 97 - 108 mmol/L Final    CO2 02/05/2021 21  21 - 32 mmol/L Final    Anion gap 02/05/2021 16* 5 - 15 mmol/L Final    Glucose 02/05/2021 78  65 - 100 mg/dL Final    BUN 02/05/2021 10  6 - 20 MG/DL Final    Creatinine 02/05/2021 1.22  0.70 - 1.30 MG/DL Final    BUN/Creatinine ratio 02/05/2021 8* 12 - 20   Final    GFR est AA 02/05/2021 >60  >60 ml/min/1.73m2 Final    GFR est non-AA 02/05/2021 >60  >60 ml/min/1.73m2 Final    Calcium 02/05/2021 8.9  8.5 - 10.1 MG/DL Final    Bilirubin, total 02/05/2021 0.8  0.2 - 1.0 MG/DL Final    ALT (SGPT) 02/05/2021 27  12 - 78 U/L Final    AST (SGOT) 02/05/2021 45* 15 - 37 U/L Final    Alk.  phosphatase 02/05/2021 67  45 - 117 U/L Final    Protein, total 02/05/2021 7.9  6.4 - 8.2 g/dL Final    Albumin 02/05/2021 4.5  3.5 - 5.0 g/dL Final    Globulin 02/05/2021 3.4  2.0 - 4.0 g/dL Final    A-G Ratio 02/05/2021 1.3  1.1 - 2.2   Final    ALCOHOL(ETHYL),SERUM 02/05/2021 <10  <10 MG/DL Final    Color 02/05/2021 YELLOW/STRAW    Final    Appearance 02/05/2021 CLEAR  CLEAR   Final    Specific gravity 02/05/2021 <1.005  1.003 - 1.030 Final    pH (UA) 02/05/2021 6.5  5.0 - 8.0   Final    Protein 02/05/2021 Negative  NEG mg/dL Final    Glucose 02/05/2021 Negative  NEG mg/dL Final    Ketone 02/05/2021 40* NEG mg/dL Final    Bilirubin 02/05/2021 Negative  NEG   Final    Blood 02/05/2021 Negative  NEG   Final    Urobilinogen 02/05/2021 0.2  0.2 - 1.0 EU/dL Final    Nitrites 02/05/2021 Negative  NEG   Final    Leukocyte Esterase 02/05/2021 Negative  NEG   Final    WBC 02/05/2021 0-4  0 - 4 /hpf Final    RBC 02/05/2021 0-5  0 - 5 /hpf Final    Epithelial cells 02/05/2021 FEW  FEW /lpf Final    Bacteria 02/05/2021 Negative  NEG /hpf Final    UA:UC IF INDICATED 02/05/2021 CULTURE NOT INDICATED BY UA RESULT  CNI   Final    AMPHETAMINES 02/05/2021 Negative  NEG   Final    BARBITURATES 02/05/2021 Negative  NEG   Final    BENZODIAZEPINES 02/05/2021 Negative  NEG   Final    COCAINE 02/05/2021 Negative  NEG   Final    METHADONE 02/05/2021 Negative  NEG   Final    OPIATES 02/05/2021 Negative  NEG   Final    PCP(PHENCYCLIDINE) 02/05/2021 Negative  NEG   Final    THC (TH-CANNABINOL) 02/05/2021 Positive* NEG   Final    Drug screen comment 02/05/2021 (NOTE)   Final    SARS-CoV-2 02/05/2021 Please find results under separate order    Final    Specimen source 02/05/2021 Nasopharyngeal    Final    COVID-19 rapid test 02/05/2021 Not detected  NOTD   Final    Specimen source 02/05/2021 Nasopharyngeal    Final    SARS-CoV-2 02/05/2021 Not detected  NOTD   Final    LIPID PROFILE 02/06/2021        Final    Cholesterol, total 02/06/2021 134  <200 MG/DL Final    Triglyceride 02/06/2021 61  <150 MG/DL Final    HDL Cholesterol 02/06/2021 61  MG/DL Final    LDL, calculated 02/06/2021 60.8  0 - 100 MG/DL Final    VLDL, calculated 02/06/2021 12.2  MG/DL Final    CHOL/HDL Ratio 02/06/2021 2.2  0.0 - 5.0   Final    TSH 02/06/2021 1.16  0.36 - 3.74 uIU/mL Final    Glucose 02/06/2021 72  65 - 100 MG/DL Final    Sodium 02/07/2021 144  136 - 145 mmol/L Final    Potassium 02/07/2021 3.7  3.5 - 5.1 mmol/L Final    Chloride 02/07/2021 107  97 - 108 mmol/L Final    CO2 02/07/2021 27  21 - 32 mmol/L Final    Anion gap 02/07/2021 10  5 - 15 mmol/L Final    Glucose 02/07/2021 84  65 - 100 mg/dL Final    BUN 02/07/2021 12  6 - 20 MG/DL Final    Creatinine 02/07/2021 1.22  0.70 - 1.30 MG/DL Final    BUN/Creatinine ratio 02/07/2021 10* 12 - 20   Final    GFR est AA 02/07/2021 >60  >60 ml/min/1.73m2 Final    GFR est non-AA 02/07/2021 >60  >60 ml/min/1.73m2 Final    Calcium 02/07/2021 8.8  8.5 - 10.1 MG/DL Final    Hemoglobin A1c 02/08/2021 5.2  4.0 - 5.6 % Final    Est. average glucose 02/08/2021 103  mg/dL Final     No results found. DISPOSITION:    Home. Patient to f/u with  psychiatric, and psychotherapy appointments. Patient is to f/u with internist as directed. FOLLOW-UP CARE:    Activity as tolerated  Regular diet  Wound Care: none needed. Follow-up Information     Follow up With Specialties Details Why Contact Info    None    None ([phone]) Patient stated that they have no PCP                   PROGNOSIS:   Fair ---- based on nature of patient's pathology/ies and treatment compliance issues.   Prognosis is greatly dependent upon patient's ability to remain sober and to follow up with scheduled appointments as well as to comply with psychiatric medications as prescribed.            DISCHARGE MEDICATIONS:     Informed consent given for the use of following psychotropic medications:  Current Discharge Medication List      START taking these medications    Details   risperiDONE (RisperDAL) 1 mg tablet Take 1 Tab by mouth two (2) times a day. Indications: schizophrenia  Qty: 60 Tab, Refills: 0      traZODone (DESYREL) 50 mg tablet Take 1 Tab by mouth nightly as needed for Sleep (For insomnia). Indications: insomnia associated with depression  Qty: 30 Tab, Refills: 0      hydrOXYzine HCL (ATARAX) 50 mg tablet Take 1 Tab by mouth three (3) times daily as needed for Anxiety for up to 10 days. Indications: anxious  Qty: 30 Tab, Refills: 0         CONTINUE these medications which have NOT CHANGED    Details   acetaminophen (TYLENOL EXTRA STRENGTH) 500 mg tablet Take 2 Tabs by mouth every six (6) hours as needed for Pain.  Qty: 40 Tab, Refills: 0                    A coordinated, multidisplinary treatment team round was conducted with Luis Kaur---this is done daily here at [facility]. This team consists of the nurse, psychiatric unit pharmacist,  and writer.     I have spent greater than 35 minutes on discharge work.    Signed:  Gabriel Mejia MD  2/8/2021

## 2021-02-08 NOTE — PROGRESS NOTES
Problem: Falls - Risk of  Goal: *Absence of Falls  Description: Document Spencer Mejia Fall Risk and appropriate interventions in the flowsheet.   Outcome: Progressing Towards Goal  Note: Fall Risk Interventions:            Medication Interventions: Teach patient to arise slowly

## 2021-02-08 NOTE — PROGRESS NOTES
Patient alert and verbal. Discharged to father's home to continue recommended plan of care. Discharge instructions reviewed with the pt. Patient verbalized understanding. Patients belongings, were returned. Patient ambulated with [facility] staff member to the ED entrance to his uber/lyft ride home.

## 2021-02-08 NOTE — BH NOTES
[employer] conducted a virtual TDO Hearing this morning. The ending result of hearing, patient Dismissed.

## 2021-02-08 NOTE — BH NOTES
Behavioral Health Transition Record to Provider    Patient Name: Aly Ward  YOB: 1990  Medical Record Number: [id number]  Date of Admission: 2/5/2021  Date of Discharge: 2/8/21    Attending Provider: No att. providers found  Discharging Provider: Dr. Ranulfo Coon MD  To contact this individual call [phone] and ask the  to page. If unavailable, ask to be transferred to [address] [place] [address] Provider on call. [facility] Provider will be available on call 24/7 and during holidays. Primary Care Provider: None    No Known Allergies    Reason for Admission: Pt chart review, pt brought to ED by father due to increased bizarre behavior. Current stressors include lack of sleep and relationship challenges with wife. Pt denies previous psych hx and dx. This is incongruent with nursing notes. Pt confirms AH/VH for 4-5 years, thoughts of hurting self, denies HI. Pt states he feels sad and like he lost his way in life. Pt seeking medication and therapy to stabilize inpatient.     Admission Diagnosis: Psychosis ([facility] [address] [F29]    * No surgery found *    Results for orders placed or performed during the hospital encounter of 02/05/21   COVID-19 RAPID TEST   Result Value Ref Range    Specimen source Nasopharyngeal      COVID-19 rapid test Not detected NOTD     SARS-COV-2, PCR    Specimen: Nasopharyngeal   Result Value Ref Range    Specimen source Nasopharyngeal      SARS-CoV-2 Not detected NOTD     CBC WITH AUTOMATED DIFF   Result Value Ref Range    WBC 9.4 4.1 - 11.1 K/uL    RBC 4.66 4.10 - 5.70 M/uL    HGB 14.4 12.1 - 17.0 g/dL    HCT 40.7 36.6 - 50.3 %    MCV 87.3 80.0 - 99.0 FL    MCH 30.9 26.0 - 34.0 PG    MCHC 35.4 30.0 - 36.5 g/dL    RDW 11.9 11.5 - 14.5 %    PLATELET 612 290 - 958 K/uL    MPV 9.4 8.9 - 12.9 FL    NRBC 0.0 0  WBC    ABSOLUTE NRBC 0.00 0.00 - 0.01 K/uL    NEUTROPHILS 73 32 - 75 %    LYMPHOCYTES 16 12 - 49 %    MONOCYTES 11 5 - 13 %    EOSINOPHILS 0 0 - 7 % BASOPHILS 0 0 - 1 %    IMMATURE GRANULOCYTES 0 0.0 - 0.5 %    ABS. NEUTROPHILS 6.9 1.8 - 8.0 K/UL    ABS. LYMPHOCYTES 1.5 0.8 - 3.5 K/UL    ABS. MONOCYTES 1.0 0.0 - 1.0 K/UL    ABS. EOSINOPHILS 0.0 0.0 - 0.4 K/UL    ABS. BASOPHILS 0.0 0.0 - 0.1 K/UL    ABS. IMM. GRANS. 0.0 0.00 - 0.04 K/UL    DF AUTOMATED     METABOLIC PANEL, COMPREHENSIVE   Result Value Ref Range    Sodium 132 (L) 136 - 145 mmol/L    Potassium 3.0 (L) 3.5 - 5.1 mmol/L    Chloride 95 (L) 97 - 108 mmol/L    CO2 21 21 - 32 mmol/L    Anion gap 16 (H) 5 - 15 mmol/L    Glucose 78 65 - 100 mg/dL    BUN 10 6 - 20 MG/DL    Creatinine 1.22 0.70 - 1.30 MG/DL    BUN/Creatinine ratio 8 (L) 12 - 20      GFR est AA >60 >60 ml/min/1.73m2    GFR est non-AA >60 >60 ml/min/1.73m2    Calcium 8.9 8.5 - 10.1 MG/DL    Bilirubin, total 0.8 0.2 - 1.0 MG/DL    ALT (SGPT) 27 12 - 78 U/L    AST (SGOT) 45 (H) 15 - 37 U/L    Alk.  phosphatase 67 45 - 117 U/L    Protein, total 7.9 6.4 - 8.2 g/dL    Albumin 4.5 3.5 - 5.0 g/dL    Globulin 3.4 2.0 - 4.0 g/dL    A-G Ratio 1.3 1.1 - 2.2     ETHYL ALCOHOL   Result Value Ref Range    ALCOHOL(ETHYL),SERUM <10 <10 MG/DL   URINALYSIS W/ REFLEX CULTURE    Specimen: Urine   Result Value Ref Range    Color YELLOW/STRAW      Appearance CLEAR CLEAR      Specific gravity <1.005 1.003 - 1.030    pH (UA) 6.5 5.0 - 8.0      Protein Negative NEG mg/dL    Glucose Negative NEG mg/dL    Ketone 40 (A) NEG mg/dL    Bilirubin Negative NEG      Blood Negative NEG      Urobilinogen 0.2 0.2 - 1.0 EU/dL    Nitrites Negative NEG      Leukocyte Esterase Negative NEG      WBC 0-4 0 - 4 /hpf    RBC 0-5 0 - 5 /hpf    Epithelial cells FEW FEW /lpf    Bacteria Negative NEG /hpf    UA:UC IF INDICATED CULTURE NOT INDICATED BY UA RESULT CNI     DRUG SCREEN, URINE   Result Value Ref Range    AMPHETAMINES Negative NEG      BARBITURATES Negative NEG      BENZODIAZEPINES Negative NEG      COCAINE Negative NEG      METHADONE Negative NEG      OPIATES Negative NEG PCP(PHENCYCLIDINE) Negative NEG      THC (TH-CANNABINOL) Positive (A) NEG      Drug screen comment (NOTE)    SARS-COV-2   Result Value Ref Range    SARS-CoV-2 Please find results under separate order     LIPID PANEL   Result Value Ref Range    LIPID PROFILE          Cholesterol, total 134 <200 MG/DL    Triglyceride 61 <150 MG/DL    HDL Cholesterol 61 MG/DL    LDL, calculated 60.8 0 - 100 MG/DL    VLDL, calculated 12.2 MG/DL    CHOL/HDL Ratio 2.2 0.0 - 5.0     TSH 3RD GENERATION   Result Value Ref Range    TSH 1.16 0.36 - 3.74 uIU/mL   GLUCOSE, FASTING   Result Value Ref Range    Glucose 72 65 - 700 MG/DL   METABOLIC PANEL, BASIC   Result Value Ref Range    Sodium 144 136 - 145 mmol/L    Potassium 3.7 3.5 - 5.1 mmol/L    Chloride 107 97 - 108 mmol/L    CO2 27 21 - 32 mmol/L    Anion gap 10 5 - 15 mmol/L    Glucose 84 65 - 100 mg/dL    BUN 12 6 - 20 MG/DL    Creatinine 1.22 0.70 - 1.30 MG/DL    BUN/Creatinine ratio 10 (L) 12 - 20      GFR est AA >60 >60 ml/min/1.73m2    GFR est non-AA >60 >60 ml/min/1.73m2    Calcium 8.8 8.5 - 10.1 MG/DL   HEMOGLOBIN A1C WITH EAG   Result Value Ref Range    Hemoglobin A1c 5.2 4.0 - 5.6 %    Est. average glucose 103 mg/dL       Immunizations administered during this encounter: There is no immunization history on file for this patient. Screening for Metabolic Disorders for Patients on Antipsychotic Medications  (Data obtained from the EMR)    Estimated Body Mass Index  Estimated body mass index is 19.12 kg/m² as calculated from the following:    Height as of this encounter: 6' 3\" (1.905 m). Weight as of this encounter: 69.4 kg (153 lb).      Vital Signs/Blood Pressure  Visit Vitals  BP (!) 150/97 (BP 1 Location: Right upper arm, BP Patient Position: Sitting)   Pulse 80   Temp 97.7 °F (36.5 °C)   Resp 18   Ht 6' 3\" (1.905 m)   Wt 69.4 kg (153 lb)   SpO2 100%   BMI 19.12 kg/m²       Blood Glucose/Hemoglobin A1c  Lab Results   Component Value Date/Time    Glucose 84 02/07/2021 04:38 AM    Glucose 72 02/06/2021 05:49 AM       Lab Results   Component Value Date/Time    Hemoglobin A1c 5.2 02/08/2021 04:35 AM        Lipid Panel  Lab Results   Component Value Date/Time    Cholesterol, total 134 02/06/2021 05:49 AM    HDL Cholesterol 61 02/06/2021 05:49 AM    LDL, calculated 60.8 02/06/2021 05:49 AM    Triglyceride 61 02/06/2021 05:49 AM    CHOL/HDL Ratio 2.2 02/06/2021 05:49 AM        Discharge Diagnosis: please refer to physician's discharge summary    Discharge Plan: The patient Joanna Lu exhibits the ability to control behavior in a less restrictive environment. Patient's level of functioning is improving. No assaultive/destructive behavior has been observed for the past 24 hours. No suicidal/homicidal threat or behavior has been observed for the past 24 hours. There is no evidence of serious medication side effects. Patient has not been in physical or protective restraints for at least the past 24 hours. If weapons involved, how are they secured? No weapons    Is patient aware of and in agreement with discharge plan? Patient agrees to discharge and follow up with [facility] for medication:  Prescriptions sent to [facility]    Copy of discharge instructions to provider?:  Fax to [facility] for transportation home:      Keep all follow up appointments as scheduled, continue to take prescribed medications per physician instructions. Mental health crisis number:  [phone] or your local mental health crisis line number at [phone]      Discharge Medication List and Instructions:   Discharge Medication List as of 2/8/2021 11:38 AM      START taking these medications    Details   risperiDONE (RisperDAL) 1 mg tablet Take 1 Tab by mouth two (2) times a day. Indications: schizophrenia, Normal, Disp-60 Tab, R-0      traZODone (DESYREL) 50 mg tablet Take 1 Tab by mouth nightly as needed for Sleep (For insomnia).  Indications: insomnia associated with depression, Normal, Disp-30 Tab, R-0      hydrOXYzine HCL (ATARAX) 50 mg tablet Take 1 Tab by mouth three (3) times daily as needed for Anxiety for up to 10 days. Indications: anxious, Normal, Disp-30 Tab, R-0         CONTINUE these medications which have NOT CHANGED    Details   acetaminophen (TYLENOL EXTRA STRENGTH) 500 mg tablet Take 2 Tabs by mouth every six (6) hours as needed for Pain., Print, Disp-40 Tab, R-0             Unresulted Labs (24h ago, onward)    None        To obtain results of studies pending at discharge, please contact N/A    Follow-up Information     Follow up With Specialties Details Why Contact Info    None    None ([phone]) Patient stated that they have no PCP      [facility] Please call for rapid access intake appointment for ongoing mental health case management, therapy and medication management. You contact information was left on the voicemail and they should call you today. Call Tuesday if you do not receive a call to [address]  [place], [address]  [phone] 24/7 Crisis          Advanced Directive:   Does the patient have an appointed surrogate decision maker? No  Does the patient have a Medical Advance Directive? No  Does the patient have a Psychiatric Advance Directive? No  If the patient does not have a surrogate or Medical Advance Directive AND Psychiatric Advance Directive, the patient was offered information on these advance directives Patient declined to complete    Patient Instructions: Please continue all medications until otherwise directed by physician. Tobacco Cessation Discharge Plan:   Is the patient a smoker and needs referral for smoking cessation? Yes  Patient referred to the following for smoking cessation with an appointment? Yes     Patient was offered medication to assist with smoking cessation at discharge? Yes  Was education for smoking cessation added to the discharge instructions? Yes    Alcohol/Substance Abuse Discharge Plan:   Does the patient have a history of substance/alcohol abuse and requires a referral for treatment? Yes  Patient referred to the following for substance/alcohol abuse treatment with an appointment? Yes  Patient was offered medication to assist with alcohol cessation at discharge? Yes  Was education for substance/alcohol abuse added to discharge instructions? Yes    Patient discharged to Home; discussed with patient/caregiver and provided to the patient/caregiver either in hard copy or electronically.

## 2021-02-08 NOTE — DISCHARGE INSTRUCTIONS
Patient Education        Schizophrenia: Care Instructions  Your Care Instructions  Schizophrenia is a disease that makes it hard to think clearly, manage emotions, and interact with other people. It can cause:  · Delusions. These are beliefs that are not real.  · Hallucinations. These are things that you may see or hear that are not really there. · Paranoia. This is the belief that others are lying, cheating, using you, or trying to harm you. The disease may change your ability to enjoy life, express emotions, or function. At times, you may hear voices, behave strangely, have trouble speaking or understanding speech, or keep to yourself. The goal of treatment is to lower your stress and help your brain function normally. You may need lifelong treatment with medicines and counseling to keep your schizophrenia under control. When schizophrenia is not treated, the risks are higher for suicide, a hospital stay, and other problems. Early treatment called coordinated specialty care [facility]) may help a person who is having his or her first episode of psychotic thoughts. Ask your doctor about [address]. Follow-up care is a key part of your treatment and safety. Be sure to make and go to all appointments, and call your doctor if you are having problems. It's also a good idea to know your test results and keep a list of the medicines you take. How can you care for yourself at home? · Be safe with medicines. Take your medicines exactly as prescribed. Call your doctor if you think you are having a problem with your medicine. When you feel good, you may think that you do not need your medicines. But it is important to keep taking them as scheduled. · Go to your counseling sessions. Call and talk with your counselor if you can't attend or if you don't think the sessions are helping. Do not just stop going. · Eat a healthy diet. Talk with a dietitian about what type of diet may be best for you.   · Do not use alcohol or illegal drugs.  · Keep the numbers for these national suicide hotlines: 4-169-687-TALK (8-201.779.7499) and 1-509-KPDJIOC (0-872.582.8078). If you or someone you know talks about suicide or feeling hopeless, get help right away. What should you do if someone in your family has schizophrenia? · Learn about the disease and how it may get worse over time. · Remind your family member that you love him or her. · Make a plan with all family members about how to take care of your loved one when his or her symptoms are bad. · Talk about your fears and concerns and those of other family members. Seek counseling if needed. · Do not focus attention only on the person who is in treatment. · Remind yourself that it will take time for changes to occur. · Do not blame yourself for the disease. · Know your legal rights and the legal rights of your family member. · Take care of yourself. Stay involved with your own interests, such as your career, hobbies, and friends. Use exercise, positive self-talk, relaxation, and deep breathing to help lower your stress. · Give yourself time to grieve. You may need to deal with emotions such as anger, fear, and frustration. After you work through your feelings, you will be better able to care for yourself and your family. · If you are having a hard time with your feelings and your interactions with your family member, talk with a counselor. When should you call for help? Call 911 anytime you think you may need emergency care. For example, call if:    · You are thinking about suicide or are threatening suicide.     · You feel like hurting yourself or someone else. Call your doctor now or seek immediate medical care if:    · You hear voices.     · You think someone is trying to harm you.     · You cannot concentrate or are easily confused.    Watch closely for changes in your health, and be sure to contact your doctor if:    · You are having trouble taking care of yourself.     · You cannot attend your counseling sessions.   Where can you learn more?  Go to [email or web address].net/[email or web address]  Enter B628 in the search box to learn more about \"Schizophrenia: Care Instructions.\"  Current as of: 2020               Content Version: 12.6  © 8144-6550 [employer].   Care instructions adapted under license by [employer] (which disclaims liability or warranty for this information). If you have questions about a medical condition or this instruction, always ask your healthcare professional. [employer] disclaims any warranty or liability for your use of this information.       DISCHARGE SUMMARY    NAME:Luis Kaur  : 1990  MRN: [id number]    The patient Luis Kaur exhibits the ability to control behavior in a less restrictive environment.  Patient's level of functioning is improving.  No assaultive/destructive behavior has been observed for the past 24 hours.  No suicidal/homicidal threat or behavior has been observed for the past 24 hours.  There is no evidence of serious medication side effects.  Patient has not been in physical or protective restraints for at least the past 24 hours.    If weapons involved, how are they secured? No weapons    Is patient aware of and in agreement with discharge plan? Patient agrees to discharge and follow up with [facility]    Arrangements for medication:  Prescriptions sent to [facility]    Copy of discharge instructions to provider?:  Fax to [facility]    Arrangements for transportation home:      Keep all follow up appointments as scheduled, continue to take prescribed medications per physician instructions.  Mental health crisis number:  911 or your local mental health crisis line number at [phone]        Mental Health Emergency WARM LINE      1-377-441-MHAV 6428)      M-F: 9am to 9pm      Sat & Sun: 5pm - 9pm  National suicide prevention lines:                              5-325-SRQEWQI (4-279-655-695-035-8416)       4-826-585-TALK (2-194-340-030-017-8076)   24/7 Crisis Text Line:  Text HOME to [phone]

## 2021-02-08 NOTE — PROGRESS NOTES
Problem: Falls - Risk of  Goal: *Absence of Falls  Description: Document Omer Zepeda Fall Risk and appropriate interventions in the flowsheet. Outcome: Progressing Towards Goal  Note: Fall Risk Interventions:   Medication Interventions: Teach patient to arise slowly  Problem: Suicide  Goal: *STG: Remains safe in hospital  Outcome: Progressing Towards Goal  Goal: *STG: Seeks staff when feelings of self harm or harm towards others arise  Outcome: Progressing Towards Goal   Pt is visible on the unit. Social with peers. Calm and cooperative with care and unit routines. Accepts medications as prescribed. Denies s/e. Spent the evening in the lounge watching the game with peers. Reports feeling much better than he did on admission. Denies s/I, h/I and a/v hallucinations this shift. Endorsed anxiety 6/10 and depression 3/10. Pt remains calm and in control. Request prn medication for sleeplessness. Trazodone given with good results. [id number] Observed in bed eyes closed with even unlabored breathing. Appeared to be sleeping for 8 hrs. Blood work drawn.  Tolerated well

## 2021-02-08 NOTE — BH NOTES
Pt has been alert and oriented x 4. He is cooperative, pleasant, med and meal compliant. Pt denies all SI/HI, AVH. Denies any depression and anxiety at this time, He has been present on the milieu and interacting with peers and staff. No issues noted.

## 2021-02-08 NOTE — PROGRESS NOTES
Laboratory monitoring for mood stabilizer and antipsychotics:    Recommended baseline monitoring has been completed based on this patient's current medication regimen. The patient is currently taking the following medication(s):   Current Facility-Administered Medications   Medication Dose Route Frequency    risperiDONE (RisperDAL) tablet 1 mg  1 mg Oral BID    nicotine (NICODERM CQ) 21 mg/24 hr patch 1 Patch  1 Patch TransDERmal DAILY       Height, Weight, BMI Estimation  Estimated body mass index is 19.12 kg/m² as calculated from the following:    Height as of this encounter: 190.5 cm (75\"). Weight as of this encounter: 69.4 kg (153 lb). Renal Function, Hepatic Function and Chemistry  Estimated Creatinine Clearance: 87.7 mL/min (based on SCr of 1.22 mg/dL). Lab Results   Component Value Date/Time    Sodium 144 02/07/2021 04:38 AM    Potassium 3.7 02/07/2021 04:38 AM    Chloride 107 02/07/2021 04:38 AM    CO2 27 02/07/2021 04:38 AM    Anion gap 10 02/07/2021 04:38 AM    Glucose 84 02/07/2021 04:38 AM    Glucose 72 02/06/2021 05:49 AM    BUN 12 02/07/2021 04:38 AM    Creatinine 1.22 02/07/2021 04:38 AM    BUN/Creatinine ratio 10 (L) 02/07/2021 04:38 AM    GFR est AA >60 02/07/2021 04:38 AM    GFR est non-AA >60 02/07/2021 04:38 AM    Calcium 8.8 02/07/2021 04:38 AM    ALT (SGPT) 27 02/05/2021 01:47 PM    Alk.  phosphatase 67 02/05/2021 01:47 PM    Protein, total 7.9 02/05/2021 01:47 PM    Albumin 4.5 02/05/2021 01:47 PM    Globulin 3.4 02/05/2021 01:47 PM    A-G Ratio 1.3 02/05/2021 01:47 PM    Bilirubin, total 0.8 02/05/2021 01:47 PM       Lab Results   Component Value Date/Time    Glucose 84 02/07/2021 04:38 AM    Glucose 72 02/06/2021 05:49 AM       Lab Results   Component Value Date/Time    Hemoglobin A1c 5.2 02/08/2021 04:35 AM       Hematology  Lab Results   Component Value Date/Time    WBC 9.4 02/05/2021 01:47 PM    HGB 14.4 02/05/2021 01:47 PM    HCT 40.7 02/05/2021 01:47 PM    PLATELET 670 02/05/2021 01:47 PM    MCV 87.3 02/05/2021 01:47 PM       Lipids  Lab Results   Component Value Date/Time    Cholesterol, total 134 02/06/2021 05:49 AM    HDL Cholesterol 61 02/06/2021 05:49 AM    LDL, calculated 60.8 02/06/2021 05:49 AM    Triglyceride 61 02/06/2021 05:49 AM    CHOL/HDL Ratio 2.2 02/06/2021 05:49 AM       Thyroid Function    Lab Results   Component Value Date/Time    TSH 1.16 02/06/2021 05:49 AM     Vitals  Visit Vitals  BP (!) 150/97 (BP 1 Location: Right upper arm, BP Patient Position: Sitting)   Pulse 80   Temp 97.7 °F (36.5 °C)   Resp 18   Ht 190.5 cm (75\")   Wt 69.4 kg (153 lb)   SpO2 100%   BMI 19.12 kg/m²       Man Spangler, PHARMD, BCPS  [phone] (pharmacy)

## 2021-02-08 NOTE — PROGRESS NOTES
Problem: Falls - Risk of  Goal: *Absence of Falls  Description: Document Alex Larson Fall Risk and appropriate interventions in the flowsheet.   2/8/2021 1135 by Shasha Franco RN  Outcome: Resolved/Met  2/8/2021 1134 by Shasha Franco, RN  Outcome: Progressing Towards Goal  Note: Fall Risk Interventions:            Medication Interventions: Teach patient to arise slowly

## 2021-02-08 NOTE — PROGRESS NOTES
Pharmacist Discharge Medication Reconciliation    Discharging Provider: Dr. Bertin Pritchett PMH:   Past Medical History:   Diagnosis Date    Tobacco abuse      Chief Complaint for this Admission:   Chief Complaint   Patient presents with    Mental Health Problem     Allergies: Patient has no known allergies. Discharge Medications:   Current Discharge Medication List        START taking these medications    Details   risperiDONE (RisperDAL) 1 mg tablet Take 1 Tab by mouth two (2) times a day. Indications: schizophrenia  Qty: 61 Tab, Refills: 0      traZODone (DESYREL) 50 mg tablet Take 1 Tab by mouth nightly as needed for Sleep (For insomnia). Indications: insomnia associated with depression  Qty: 30 Tab, Refills: 0      hydrOXYzine HCL (ATARAX) 50 mg tablet Take 1 Tab by mouth three (3) times daily as needed for Anxiety for up to 10 days. Indications: anxious  Qty: 30 Tab, Refills: 0           CONTINUE these medications which have NOT CHANGED    Details   acetaminophen (TYLENOL EXTRA STRENGTH) 500 mg tablet Take 2 Tabs by mouth every six (6) hours as needed for Pain.   Qty: 40 Tab, Refills: 0             The patient's chart, MAR and AVS were reviewed by Carlos Joseph, PHARMD, BCPS

## 2021-08-12 NOTE — ED TRIAGE NOTES
Pt reports headache and shortness of breath/flu like symptoms x 2 days with secondary complaint of finger tips feeling \"mad weird\". positive return of urine in the collection bag

## 2022-03-19 PROBLEM — R45.851 SUICIDAL IDEATION: Status: ACTIVE | Noted: 2018-01-22

## 2022-03-19 PROBLEM — F20.9 SCHIZOPHRENIA (HCC): Status: ACTIVE | Noted: 2021-02-05

## 2023-05-18 RX ORDER — RISPERIDONE 1 MG/1
1 TABLET ORAL 2 TIMES DAILY
COMMUNITY
Start: 2021-02-08

## 2023-05-18 RX ORDER — ACETAMINOPHEN 500 MG
1000 TABLET ORAL EVERY 6 HOURS PRN
COMMUNITY
Start: 2018-02-21

## 2023-05-18 RX ORDER — TRAZODONE HYDROCHLORIDE 50 MG/1
50 TABLET ORAL
COMMUNITY
Start: 2021-02-08

## 2023-08-14 NOTE — ED NOTES
JW,    Message below from pharmacy:     Name from pharmacy: [facility]         Will file in chart as: Continuous Blood Gluc  ([facility] CACHORRO 2 READER) GASTON     Possible duplicate: Hover to review recent actions on this medication    Sig: Use to read blood sugars as per 's instructions.    Disp: 1 each    Refills: 0    Start: 8/12/2023    Class: E-Prescribe    Non-formulary For: Type 2 diabetes mellitus with diabetic polyneuropathy, with long-term current use of insulin (H)    Last ordered: 3 days ago by Joel Daniel Irwin Wegener, MD Last refill: 8/11/2023    Rx #: [id number]    Pharmacy comment: Script Clarification:MD NEEDS TO SEND SUPPORTING DOCUMENTATION TO [facility]. PLEASE FAX DOCUMENTATION TO FAX# [phone].        Name from pharmacy: [facility] 2 SENSOR         Will file in chart as: Continuous Blood Gluc Sensor ([facility] CACHORRO 2 SENSOR) MISC     Possible duplicate: Hover to review recent actions on this medication    Sig: Change every 14 days.    Original sig: CHANGE EVERY 14 DAYS    Disp: Not specified (Pharmacy requested: 2 kit)    Refills: 5    Start: 8/12/2023    Class: E-Prescribe    Non-formulary For: Type 2 diabetes mellitus with diabetic polyneuropathy, with long-term current use of insulin (H)    Last ordered: 3 days ago by Joel Daniel Irwin Wegener, MD Last refill: 8/11/2023    Rx #: [id number]    Pharmacy comment: Script Clarification:PLEASE SEND CORRECT DOCUMENTATION TO [facility]. PLEASE FAX TO #[phone].       To be filled at: [facility]/pharmacy #[id number] - [place] - [facility]           Patient has future VV with you this Friday 8/18    Thank you,  Isela Villar RN     Spoke with patient father and he states that he went to [place] to bring patient back to [place] so he can be seen for mental health. Father states that patient was seen 2 days this past week at [facility] and was offered inpatient admission but declined admission. Patient told father that he took some anti depressant and melatonin to try and help him to go to sleep because he has been up for the past 4 days. Spoke to patient's wife and she state that earlier this week she got home and patient was not at home but his phone was there. Awhile later patient came home hours later \"thinking someone was after him and his family. \" Wife states that three years ago he was prescribed Risperidone but not currently taking. Wife states that patient drinks alcohol and smoke marijuana.